# Patient Record
Sex: FEMALE | Race: WHITE | NOT HISPANIC OR LATINO | Employment: UNEMPLOYED | URBAN - METROPOLITAN AREA
[De-identification: names, ages, dates, MRNs, and addresses within clinical notes are randomized per-mention and may not be internally consistent; named-entity substitution may affect disease eponyms.]

---

## 2017-01-17 ENCOUNTER — OFFICE VISIT (OUTPATIENT)
Dept: FAMILY MEDICINE CLINIC | Facility: CLINIC | Age: 52
End: 2017-01-17

## 2017-01-17 VITALS
DIASTOLIC BLOOD PRESSURE: 64 MMHG | TEMPERATURE: 96.7 F | RESPIRATION RATE: 14 BRPM | SYSTOLIC BLOOD PRESSURE: 108 MMHG | WEIGHT: 162 LBS | HEIGHT: 67 IN | BODY MASS INDEX: 25.43 KG/M2 | HEART RATE: 55 BPM

## 2017-01-17 DIAGNOSIS — F43.0 ACUTE REACTION TO STRESS: ICD-10-CM

## 2017-01-17 DIAGNOSIS — E78.2 MIXED HYPERLIPIDEMIA: ICD-10-CM

## 2017-01-17 DIAGNOSIS — F51.01 PRIMARY INSOMNIA: ICD-10-CM

## 2017-01-17 DIAGNOSIS — F33.42 MAJOR DEPRESSIVE DISORDER, RECURRENT EPISODE, IN FULL REMISSION (HCC): ICD-10-CM

## 2017-01-17 DIAGNOSIS — E03.9 ACQUIRED HYPOTHYROIDISM: ICD-10-CM

## 2017-01-17 PROCEDURE — 99214 OFFICE O/P EST MOD 30 MIN: CPT | Performed by: FAMILY MEDICINE

## 2017-01-17 RX ORDER — FENOFIBRATE 90 MG/1
90 CAPSULE ORAL DAILY
Qty: 30 CAPSULE | Refills: 5 | Status: SHIPPED | OUTPATIENT
Start: 2017-01-17 | End: 2017-07-11 | Stop reason: SDUPTHER

## 2017-01-17 RX ORDER — ALPRAZOLAM 0.5 MG/1
0.5 TABLET ORAL 2 TIMES DAILY
Qty: 60 TABLET | Refills: 2 | Status: SHIPPED | OUTPATIENT
Start: 2017-01-17 | End: 2017-04-19 | Stop reason: SDUPTHER

## 2017-01-17 RX ORDER — ZOLPIDEM TARTRATE 10 MG/1
10 TABLET ORAL NIGHTLY PRN
Qty: 30 TABLET | Refills: 2 | Status: SHIPPED | OUTPATIENT
Start: 2017-01-17 | End: 2017-04-19 | Stop reason: SDUPTHER

## 2017-01-17 RX ORDER — LEVOTHYROXINE SODIUM 0.03 MG/1
25 TABLET ORAL DAILY
Qty: 30 TABLET | Refills: 5 | Status: SHIPPED | OUTPATIENT
Start: 2017-01-17 | End: 2017-07-11 | Stop reason: SDUPTHER

## 2017-01-17 RX ORDER — FLUOXETINE HYDROCHLORIDE 20 MG/1
CAPSULE ORAL
Qty: 60 CAPSULE | Refills: 5 | Status: SHIPPED | OUTPATIENT
Start: 2017-01-17 | End: 2017-07-11 | Stop reason: SDUPTHER

## 2017-01-17 NOTE — PROGRESS NOTES
"Subjective   Willie Enciso is a 51 y.o. female.     History of Present Illness     Chief Complaint:   Chief Complaint   Patient presents with   • Hyperlipidemia     [facility]   • Hypothyroidism     LATE FOR APPOINTMENT    • Anxiety   • Depression   • Insomnia       Willie Enciso 51 y.o. female who presents today for Medical Management of the below listed issues and medication refills.  she has a history of   Patient Active Problem List   Diagnosis   • Hyperlipidemia   • Hypothyroidism   • Insomnia   • Lateral epicondylitis   • Major depressive disorder, recurrent episode, in full remission   • Acute reaction to stress   .  Since the last visit, she has overall felt well.  she has been compliant with   Current Outpatient Prescriptions:   •  ALPRAZolam (XANAX) 0.5 MG tablet, Take 1 tablet by mouth 2 (Two) Times a Day., Disp: 60 tablet, Rfl: 2  •  ANTARA 90 MG capsule, Take 90 mg by mouth Daily., Disp: 30 capsule, Rfl: 5  •  FLUoxetine (PROzac) 20 MG capsule, Take 2 tablets QD, Disp: 60 capsule, Rfl: 5  •  levothyroxine (SYNTHROID, LEVOTHROID) 25 MCG tablet, Take 1 tablet by mouth Daily., Disp: 30 tablet, Rfl: 5  •  zolpidem (AMBIEN) 10 MG tablet, Take 1 tablet by mouth At Night As Needed for sleep., Disp: 30 tablet, Rfl: 2.  she denies medication side effects.    All of the chronic condition(s) listed above are stable w/o issues.    Visit Vitals   • /64   • Pulse 55   • Temp 96.7 °F (35.9 °C) (Oral)   • Resp 14   • Ht 67\" (170.2 cm)   • Wt 162 lb (73.5 kg)   • BMI 25.37 kg/m2       Results for orders placed or performed in visit on 09/14/16   Comprehensive metabolic panel   Result Value Ref Range    Glucose 92 65 - 99 mg/dL    BUN 18 6 - 20 mg/dL    Creatinine 1.21 (H) 0.57 - 1.00 mg/dL    eGFR Non African Am 47 (L) >60 mL/min/1.73    eGFR African Am 57 (L) >60 mL/min/1.73    BUN/Creatinine Ratio 14.9 7.0 - 25.0    Sodium 142 136 - 145 mmol/L    Potassium 5.6 (H) 3.5 - 5.2 mmol/L    Chloride 104 98 " - 107 mmol/L    Total CO2 24.5 22.0 - 29.0 mmol/L    Calcium 9.8 8.6 - 10.5 mg/dL    Total Protein 6.8 6.0 - 8.5 g/dL    Albumin 4.70 3.50 - 5.20 g/dL    Globulin 2.1 gm/dL    A/G Ratio 2.2 g/dL    Total Bilirubin 0.3 0.1 - 1.2 mg/dL    Alkaline Phosphatase 61 39 - 117 U/L    AST (SGOT) 16 1 - 32 U/L    ALT (SGPT) 15 1 - 33 U/L   Lipid panel   Result Value Ref Range    Total Cholesterol 227 (H) 0 - 200 mg/dL    Triglycerides 136 0 - 150 mg/dL    HDL Cholesterol 47 40 - 60 mg/dL    VLDL Cholesterol 27.2 5 - 40 mg/dL    LDL Cholesterol  153 (H) 0 - 100 mg/dL   T4, free   Result Value Ref Range    Free T4 1.41 0.93 - 1.70 ng/dL   TSH   Result Value Ref Range    TSH 5.010 (H) 0.270 - 4.200 mIU/mL   CBC and Differential   Result Value Ref Range    WBC 7.08 4.50 - 10.70 10*3/mm3    RBC 4.62 3.90 - 5.20 10*6/mm3    Hemoglobin 14.2 11.9 - 15.5 g/dL    Hematocrit 43.9 35.6 - 45.5 %    MCV 95.0 80.5 - 98.2 fL    MCH 30.7 26.9 - 32.7 pg    MCHC 32.3 (L) 32.4 - 36.3 g/dL    RDW 13.5 (H) 11.7 - 13.0 %    Platelets 266 140 - 500 10*3/mm3    Neutrophil Rel % 53.5 42.7 - 76.0 %    Lymphocyte Rel % 33.9 19.6 - 45.3 %    Monocyte Rel % 8.1 5.0 - 12.0 %    Eosinophil Rel % 3.2 0.3 - 6.2 %    Basophil Rel % 1.0 0.0 - 1.5 %    Neutrophils Absolute 3.79 1.90 - 8.10 10*3/mm3    Lymphocytes Absolute 2.40 0.90 - 4.80 10*3/mm3    Monocytes Absolute 0.57 0.20 - 1.20 10*3/mm3    Eosinophils Absolute 0.23 0.00 - 0.70 10*3/mm3    Basophils Absolute 0.07 0.00 - 0.20 10*3/mm3    Immature Granulocyte Rel % 0.3 0.0 - 0.5 %    Immature Grans Absolute 0.02 0.00 - 0.03 10*3/mm3         The following portions of the patient's history were reviewed and updated as appropriate: allergies, current medications, past family history, past medical history, past social history, past surgical history and problem list.    Review of Systems   Constitutional: Negative for activity change, chills, fatigue and fever.   Respiratory: Negative for cough and chest  tightness.    Cardiovascular: Negative for chest pain and palpitations.   Gastrointestinal: Negative for abdominal pain and nausea.   Endocrine: Negative for cold intolerance.   Psychiatric/Behavioral: Negative for behavioral problems and dysphoric mood.       Objective   Physical Exam   Constitutional: She appears well-developed and well-nourished.   Neck: Neck supple. No thyromegaly present.   Cardiovascular: Normal rate and regular rhythm.    No murmur heard.  Pulmonary/Chest: Effort normal and breath sounds normal.   Abdominal: Bowel sounds are normal.   Psychiatric: She has a normal mood and affect. Her behavior is normal.   Nursing note and vitals reviewed.    The patient has read and signed the [facility] Controlled Substance Contract.  I will continue to see patient for regular follow up appointments.  They are well controlled on their medication.  CHIQUI has been reviewed by me and is updated every 3 months. The patient is aware of the potential for addiction and dependence.    Assessment/Plan   Willie was seen today for hyperlipidemia, hypothyroidism, anxiety, depression and insomnia.    Diagnoses and all orders for this visit:    Acute reaction to stress  -     ALPRAZolam (XANAX) 0.5 MG tablet; Take 1 tablet by mouth 2 (Two) Times a Day.    Primary insomnia  -     zolpidem (AMBIEN) 10 MG tablet; Take 1 tablet by mouth At Night As Needed for sleep.    Acquired hypothyroidism  -     levothyroxine (SYNTHROID, LEVOTHROID) 25 MCG tablet; Take 1 tablet by mouth Daily.    Major depressive disorder, recurrent episode, in full remission  -     FLUoxetine (PROzac) 20 MG capsule; Take 2 tablets QD    Mixed hyperlipidemia  -     ANTARA 90 MG capsule; Take 90 mg by mouth Daily.

## 2017-01-17 NOTE — MR AVS SNAPSHOT
Willie Enciso   1/17/2017 8:15 AM   Office Visit    Provider:  Tom Montoya MD   Department:  [facility]   Dept Phone:  [phone]                Your Full Care Plan              Where to Get Your Medications      These medications were sent to [facility] [place], [place] - [address] - [phone]  - [phone]   [address] [facility] [address]     Phone:  [phone]     ANTARA 90 MG capsule    FLUoxetine 20 MG capsule    levothyroxine 25 MCG tablet         You can get these medications from any pharmacy     Bring a paper prescription for each of these medications     ALPRAZolam 0.5 MG tablet    zolpidem 10 MG tablet            Your Updated Medication List          This list is accurate as of: 1/17/17  9:10 AM.  Always use your most recent med list.                ALPRAZolam 0.5 MG tablet   Commonly known as:  XANAX   Take 1 tablet by mouth 2 (Two) Times a Day.       ANTARA 90 MG capsule   Generic drug:  Fenofibrate Micronized   Take 90 mg by mouth Daily.       FLUoxetine 20 MG capsule   Commonly known as:  PROzac   Take 2 tablets QD       levothyroxine 25 MCG tablet   Commonly known as:  SYNTHROID, LEVOTHROID   Take 1 tablet by mouth Daily.       zolpidem 10 MG tablet   Commonly known as:  AMBIEN   Take 1 tablet by mouth At Night As Needed for sleep.               You Were Diagnosed With        Codes Comments    Acute reaction to stress     ICD-10-CM: F43.0  ICD-9-CM: 308.9     Primary insomnia     ICD-10-CM: F51.01  ICD-9-CM: 307.42     Acquired hypothyroidism     ICD-10-CM: E03.9  ICD-9-CM: 244.9     Major depressive disorder, recurrent episode, in full remission     ICD-10-CM: F33.42  ICD-9-CM: 296.36     Mixed hyperlipidemia     ICD-10-CM: E78.2  ICD-9-CM: 272.2       Instructions     None    Patient Instructions History      MyChart Signup     Our records indicate that [facility].    You can view your After Visit Summary by going to [email or web address] and logging in with your [facility] username and password.  If you don't have a [facility] username and password but a parent or guardian has access to your record, the parent or guardian should login with their own [facility] username and password and access your record to view the After Visit Summary.    If you have questions, you can email [email or web address] or call [phone] to talk to our [facility] staff.  Remember, [facility] is NOT to be used for urgent needs.  For medical emergencies, dial 911.               Other Info from Your Visit           Allergies     No Known Allergies      Reason for Visit     Hyperlipidemia med refill - CHIQUI CONDON    Hypothyroidism LATE FOR APPOINTMENT     Anxiety     Depression     Insomnia           Vital Signs     Blood Pressure Pulse Temperature Respirations Height Weight    108/64 55 96.7 °F (35.9 °C) (Oral) 14 67\" (170.2 cm) 162 lb (73.5 kg)    Body Mass Index Smoking Status                25.37 kg/m2 Current Every Day Smoker          Problems and Diagnoses Noted     Stress reaction    High cholesterol or triglycerides    Underactive thyroid    Difficulty falling or staying asleep    Major depressive disorder, recurrent episode, in full remission      "

## 2017-04-19 ENCOUNTER — OFFICE VISIT (OUTPATIENT)
Dept: FAMILY MEDICINE CLINIC | Facility: CLINIC | Age: 52
End: 2017-04-19

## 2017-04-19 VITALS
HEART RATE: 72 BPM | DIASTOLIC BLOOD PRESSURE: 76 MMHG | SYSTOLIC BLOOD PRESSURE: 114 MMHG | TEMPERATURE: 98.6 F | BODY MASS INDEX: 24.33 KG/M2 | HEIGHT: 67 IN | WEIGHT: 155 LBS | RESPIRATION RATE: 16 BRPM

## 2017-04-19 DIAGNOSIS — F43.0 ACUTE REACTION TO STRESS: ICD-10-CM

## 2017-04-19 DIAGNOSIS — F51.01 PRIMARY INSOMNIA: ICD-10-CM

## 2017-04-19 PROCEDURE — 99213 OFFICE O/P EST LOW 20 MIN: CPT | Performed by: FAMILY MEDICINE

## 2017-04-19 RX ORDER — ALPRAZOLAM 0.5 MG/1
0.5 TABLET ORAL 2 TIMES DAILY
Qty: 60 TABLET | Refills: 2 | Status: SHIPPED | OUTPATIENT
Start: 2017-04-19 | End: 2017-07-11 | Stop reason: SDUPTHER

## 2017-04-19 RX ORDER — ZOLPIDEM TARTRATE 10 MG/1
10 TABLET ORAL NIGHTLY PRN
Qty: 30 TABLET | Refills: 2 | Status: SHIPPED | OUTPATIENT
Start: 2017-04-19 | End: 2017-07-11 | Stop reason: SDUPTHER

## 2017-04-19 NOTE — PROGRESS NOTES
"Subjective   Willie Enciso is a 51 y.o. female.     History of Present Illness     Chief Complaint:   Chief Complaint   Patient presents with   • Insomnia     MED REFILL - CHIQUI / PHQ2 PHQ9 DONE    • Anxiety       Willie Enciso 51 y.o. female who presents today for Medical Management of the below listed issues and medication refills.  she has a history of   Patient Active Problem List   Diagnosis   • Hyperlipidemia   • Hypothyroidism   • Insomnia   • Lateral epicondylitis   • Major depressive disorder, recurrent episode, in full remission   • Acute reaction to stress   .  Since the last visit, she has overall felt well.  she has been compliant with   Current Outpatient Prescriptions:   •  ALPRAZolam (XANAX) 0.5 MG tablet, Take 1 tablet by mouth 2 (Two) Times a Day., Disp: 60 tablet, Rfl: 2  •  zolpidem (AMBIEN) 10 MG tablet, Take 1 tablet by mouth At Night As Needed for Sleep., Disp: 30 tablet, Rfl: 2  •  ANTARA 90 MG capsule, Take 90 mg by mouth Daily., Disp: 30 capsule, Rfl: 5  •  FLUoxetine (PROzac) 20 MG capsule, Take 2 tablets QD, Disp: 60 capsule, Rfl: 5  •  levothyroxine (SYNTHROID, LEVOTHROID) 25 MCG tablet, Take 1 tablet by mouth Daily., Disp: 30 tablet, Rfl: 5.  she denies medication side effects.    She has been eating better and exercising more and weight down!    All of the chronic condition(s) listed above are stable w/o issues.    /76  Pulse 72  Temp 98.6 °F (37 °C) (Oral)   Resp 16  Ht 67\" (170.2 cm)  Wt 155 lb (70.3 kg)  BMI 24.28 kg/m2    Results for orders placed or performed in visit on 09/14/16   Comprehensive metabolic panel   Result Value Ref Range    Glucose 92 65 - 99 mg/dL    BUN 18 6 - 20 mg/dL    Creatinine 1.21 (H) 0.57 - 1.00 mg/dL    eGFR Non African Am 47 (L) >60 mL/min/1.73    eGFR African Am 57 (L) >60 mL/min/1.73    BUN/Creatinine Ratio 14.9 7.0 - 25.0    Sodium 142 136 - 145 mmol/L    Potassium 5.6 (H) 3.5 - 5.2 mmol/L    Chloride 104 98 - 107 mmol/L    Total CO2 24.5 " 22.0 - 29.0 mmol/L    Calcium 9.8 8.6 - 10.5 mg/dL    Total Protein 6.8 6.0 - 8.5 g/dL    Albumin 4.70 3.50 - 5.20 g/dL    Globulin 2.1 gm/dL    A/G Ratio 2.2 g/dL    Total Bilirubin 0.3 0.1 - 1.2 mg/dL    Alkaline Phosphatase 61 39 - 117 U/L    AST (SGOT) 16 1 - 32 U/L    ALT (SGPT) 15 1 - 33 U/L   Lipid panel   Result Value Ref Range    Total Cholesterol 227 (H) 0 - 200 mg/dL    Triglycerides 136 0 - 150 mg/dL    HDL Cholesterol 47 40 - 60 mg/dL    VLDL Cholesterol 27.2 5 - 40 mg/dL    LDL Cholesterol  153 (H) 0 - 100 mg/dL   T4, free   Result Value Ref Range    Free T4 1.41 0.93 - 1.70 ng/dL   TSH   Result Value Ref Range    TSH 5.010 (H) 0.270 - 4.200 mIU/mL   CBC and Differential   Result Value Ref Range    WBC 7.08 4.50 - 10.70 10*3/mm3    RBC 4.62 3.90 - 5.20 10*6/mm3    Hemoglobin 14.2 11.9 - 15.5 g/dL    Hematocrit 43.9 35.6 - 45.5 %    MCV 95.0 80.5 - 98.2 fL    MCH 30.7 26.9 - 32.7 pg    MCHC 32.3 (L) 32.4 - 36.3 g/dL    RDW 13.5 (H) 11.7 - 13.0 %    Platelets 266 140 - 500 10*3/mm3    Neutrophil Rel % 53.5 42.7 - 76.0 %    Lymphocyte Rel % 33.9 19.6 - 45.3 %    Monocyte Rel % 8.1 5.0 - 12.0 %    Eosinophil Rel % 3.2 0.3 - 6.2 %    Basophil Rel % 1.0 0.0 - 1.5 %    Neutrophils Absolute 3.79 1.90 - 8.10 10*3/mm3    Lymphocytes Absolute 2.40 0.90 - 4.80 10*3/mm3    Monocytes Absolute 0.57 0.20 - 1.20 10*3/mm3    Eosinophils Absolute 0.23 0.00 - 0.70 10*3/mm3    Basophils Absolute 0.07 0.00 - 0.20 10*3/mm3    Immature Granulocyte Rel % 0.3 0.0 - 0.5 %    Immature Grans Absolute 0.02 0.00 - 0.03 10*3/mm3         The following portions of the patient's history were reviewed and updated as appropriate: allergies, current medications, past family history, past medical history, past social history, past surgical history and problem list.    Review of Systems   Constitutional: Negative for activity change, chills, fatigue and fever.   Respiratory: Negative for cough and chest tightness.    Cardiovascular: Negative  for chest pain and palpitations.   Gastrointestinal: Negative for abdominal pain and nausea.   Endocrine: Negative for cold intolerance.   Psychiatric/Behavioral: Negative for behavioral problems and dysphoric mood.       Objective   Physical Exam   Constitutional: She appears well-developed and well-nourished.   Neck: Neck supple. No thyromegaly present.   Cardiovascular: Normal rate and regular rhythm.    No murmur heard.  Pulmonary/Chest: Effort normal and breath sounds normal.   Abdominal: Bowel sounds are normal.   Psychiatric: She has a normal mood and affect. Her behavior is normal.   Nursing note and vitals reviewed.    The patient has read and signed the [facility] Controlled Substance Contract.  I will continue to see patient for regular follow up appointments.  They are well controlled on their medication.  CHIQUI has been reviewed by me and is updated every 3 months. The patient is aware of the potential for addiction and dependence.    Assessment/Plan   Willie was seen today for insomnia and anxiety.    Diagnoses and all orders for this visit:    Primary insomnia  -     zolpidem (AMBIEN) 10 MG tablet; Take 1 tablet by mouth At Night As Needed for Sleep.    Acute reaction to stress  -     ALPRAZolam (XANAX) 0.5 MG tablet; Take 1 tablet by mouth 2 (Two) Times a Day.

## 2017-07-11 ENCOUNTER — OFFICE VISIT (OUTPATIENT)
Dept: FAMILY MEDICINE CLINIC | Facility: CLINIC | Age: 52
End: 2017-07-11

## 2017-07-11 VITALS
HEART RATE: 72 BPM | BODY MASS INDEX: 24.48 KG/M2 | DIASTOLIC BLOOD PRESSURE: 72 MMHG | SYSTOLIC BLOOD PRESSURE: 118 MMHG | RESPIRATION RATE: 16 BRPM | TEMPERATURE: 98.5 F | HEIGHT: 67 IN | WEIGHT: 156 LBS

## 2017-07-11 DIAGNOSIS — Z00.00 ANNUAL PHYSICAL EXAM: Primary | ICD-10-CM

## 2017-07-11 DIAGNOSIS — E03.9 ACQUIRED HYPOTHYROIDISM: ICD-10-CM

## 2017-07-11 DIAGNOSIS — F33.42 MAJOR DEPRESSIVE DISORDER, RECURRENT EPISODE, IN FULL REMISSION (HCC): ICD-10-CM

## 2017-07-11 DIAGNOSIS — F43.0 ACUTE REACTION TO STRESS: ICD-10-CM

## 2017-07-11 DIAGNOSIS — F51.01 PRIMARY INSOMNIA: ICD-10-CM

## 2017-07-11 DIAGNOSIS — E78.2 MIXED HYPERLIPIDEMIA: ICD-10-CM

## 2017-07-11 PROCEDURE — 99214 OFFICE O/P EST MOD 30 MIN: CPT | Performed by: FAMILY MEDICINE

## 2017-07-11 RX ORDER — LEVOTHYROXINE SODIUM 0.03 MG/1
25 TABLET ORAL DAILY
Qty: 30 TABLET | Refills: 5 | Status: SHIPPED | OUTPATIENT
Start: 2017-07-11 | End: 2017-12-18 | Stop reason: SDUPTHER

## 2017-07-11 RX ORDER — FENOFIBRATE 90 MG/1
90 CAPSULE ORAL DAILY
Qty: 30 CAPSULE | Refills: 5 | Status: SHIPPED | OUTPATIENT
Start: 2017-07-11 | End: 2017-12-18 | Stop reason: SDUPTHER

## 2017-07-11 RX ORDER — ALPRAZOLAM 0.5 MG/1
0.5 TABLET ORAL 2 TIMES DAILY
Qty: 60 TABLET | Refills: 2 | Status: SHIPPED | OUTPATIENT
Start: 2017-07-11 | End: 2017-09-20 | Stop reason: SDUPTHER

## 2017-07-11 RX ORDER — ZOLPIDEM TARTRATE 10 MG/1
10 TABLET ORAL NIGHTLY PRN
Qty: 30 TABLET | Refills: 2 | Status: SHIPPED | OUTPATIENT
Start: 2017-07-11 | End: 2017-09-20 | Stop reason: SDUPTHER

## 2017-07-11 RX ORDER — FLUOXETINE HYDROCHLORIDE 20 MG/1
CAPSULE ORAL
Qty: 60 CAPSULE | Refills: 5 | Status: SHIPPED | OUTPATIENT
Start: 2017-07-11 | End: 2017-12-18 | Stop reason: SDUPTHER

## 2017-07-11 NOTE — PROGRESS NOTES
"Subjective   Willie Enciso is a 51 y.o. female.     History of Present Illness     Chief Complaint:   Chief Complaint   Patient presents with   • Hyperlipidemia     MED REFILL CHQIUI CONDON   • Anxiety   • Insomnia   • Hypothyroidism       iWllie Enciso 51 y.o. female who presents today for Medical Management of the below listed issues and medication refills.  she has a history of   Patient Active Problem List   Diagnosis   • Hyperlipidemia   • Hypothyroidism   • Insomnia   • Lateral epicondylitis   • Major depressive disorder, recurrent episode, in full remission   • Acute reaction to stress   .  Since the last visit, she has overall felt well.  she has been compliant with   Current Outpatient Prescriptions:   •  ALPRAZolam (XANAX) 0.5 MG tablet, Take 1 tablet by mouth 2 (Two) Times a Day., Disp: 60 tablet, Rfl: 2  •  ANTARA 90 MG capsule, Take 90 mg by mouth Daily., Disp: 30 capsule, Rfl: 5  •  FLUoxetine (PROzac) 20 MG capsule, Take 2 tablets QD, Disp: 60 capsule, Rfl: 5  •  levothyroxine (SYNTHROID, LEVOTHROID) 25 MCG tablet, Take 1 tablet by mouth Daily., Disp: 30 tablet, Rfl: 5  •  zolpidem (AMBIEN) 10 MG tablet, Take 1 tablet by mouth At Night As Needed for Sleep., Disp: 30 tablet, Rfl: 2.  she denies medication side effects.    All of the chronic condition(s) listed above are stable w/o issues.    /72  Pulse 72  Temp 98.5 °F (36.9 °C) (Oral)   Resp 16  Ht 67\" (170.2 cm)  Wt 156 lb (70.8 kg)  BMI 24.43 kg/m2    Results for orders placed or performed in visit on 09/14/16   Comprehensive metabolic panel   Result Value Ref Range    Glucose 92 65 - 99 mg/dL    BUN 18 6 - 20 mg/dL    Creatinine 1.21 (H) 0.57 - 1.00 mg/dL    eGFR Non African Am 47 (L) >60 mL/min/1.73    eGFR African Am 57 (L) >60 mL/min/1.73    BUN/Creatinine Ratio 14.9 7.0 - 25.0    Sodium 142 136 - 145 mmol/L    Potassium 5.6 (H) 3.5 - 5.2 mmol/L    Chloride 104 98 - 107 mmol/L    Total CO2 24.5 22.0 - 29.0 mmol/L    Calcium 9.8 8.6 - " 10.5 mg/dL    Total Protein 6.8 6.0 - 8.5 g/dL    Albumin 4.70 3.50 - 5.20 g/dL    Globulin 2.1 gm/dL    A/G Ratio 2.2 g/dL    Total Bilirubin 0.3 0.1 - 1.2 mg/dL    Alkaline Phosphatase 61 39 - 117 U/L    AST (SGOT) 16 1 - 32 U/L    ALT (SGPT) 15 1 - 33 U/L   Lipid panel   Result Value Ref Range    Total Cholesterol 227 (H) 0 - 200 mg/dL    Triglycerides 136 0 - 150 mg/dL    HDL Cholesterol 47 40 - 60 mg/dL    VLDL Cholesterol 27.2 5 - 40 mg/dL    LDL Cholesterol  153 (H) 0 - 100 mg/dL   T4, free   Result Value Ref Range    Free T4 1.41 0.93 - 1.70 ng/dL   TSH   Result Value Ref Range    TSH 5.010 (H) 0.270 - 4.200 mIU/mL   CBC and Differential   Result Value Ref Range    WBC 7.08 4.50 - 10.70 10*3/mm3    RBC 4.62 3.90 - 5.20 10*6/mm3    Hemoglobin 14.2 11.9 - 15.5 g/dL    Hematocrit 43.9 35.6 - 45.5 %    MCV 95.0 80.5 - 98.2 fL    MCH 30.7 26.9 - 32.7 pg    MCHC 32.3 (L) 32.4 - 36.3 g/dL    RDW 13.5 (H) 11.7 - 13.0 %    Platelets 266 140 - 500 10*3/mm3    Neutrophil Rel % 53.5 42.7 - 76.0 %    Lymphocyte Rel % 33.9 19.6 - 45.3 %    Monocyte Rel % 8.1 5.0 - 12.0 %    Eosinophil Rel % 3.2 0.3 - 6.2 %    Basophil Rel % 1.0 0.0 - 1.5 %    Neutrophils Absolute 3.79 1.90 - 8.10 10*3/mm3    Lymphocytes Absolute 2.40 0.90 - 4.80 10*3/mm3    Monocytes Absolute 0.57 0.20 - 1.20 10*3/mm3    Eosinophils Absolute 0.23 0.00 - 0.70 10*3/mm3    Basophils Absolute 0.07 0.00 - 0.20 10*3/mm3    Immature Granulocyte Rel % 0.3 0.0 - 0.5 %    Immature Grans Absolute 0.02 0.00 - 0.03 10*3/mm3         The following portions of the patient's history were reviewed and updated as appropriate: allergies, current medications, past family history, past medical history, past social history, past surgical history and problem list.    Review of Systems   Constitutional: Negative for activity change, chills, fatigue and fever.   Respiratory: Negative for cough and chest tightness.    Cardiovascular: Negative for chest pain and palpitations.    Gastrointestinal: Negative for abdominal pain and nausea.   Endocrine: Negative for cold intolerance.   Psychiatric/Behavioral: Negative for behavioral problems and dysphoric mood.       Objective   Physical Exam   Constitutional: She appears well-developed and well-nourished.   Neck: Neck supple. No thyromegaly present.   Cardiovascular: Normal rate and regular rhythm.    No murmur heard.  Pulmonary/Chest: Effort normal and breath sounds normal.   Abdominal: Bowel sounds are normal.   Psychiatric: She has a normal mood and affect. Her behavior is normal.   Nursing note and vitals reviewed.    The patient has read and signed the [facility] Controlled Substance Contract.  I will continue to see patient for regular follow up appointments.  They are well controlled on their medication.  CHIQUI has been reviewed by me and is updated every 3 months. The patient is aware of the potential for addiction and dependence.    Assessment/Plan   Willie was seen today for hyperlipidemia, anxiety, insomnia and hypothyroidism.    Diagnoses and all orders for this visit:    Annual physical exam  Comments:  for labs only  Orders:  -     Comprehensive metabolic panel  -     CBC and Differential    Primary insomnia  -     zolpidem (AMBIEN) 10 MG tablet; Take 1 tablet by mouth At Night As Needed for Sleep.    Acute reaction to stress  -     ALPRAZolam (XANAX) 0.5 MG tablet; Take 1 tablet by mouth 2 (Two) Times a Day.    Acquired hypothyroidism  -     levothyroxine (SYNTHROID, LEVOTHROID) 25 MCG tablet; Take 1 tablet by mouth Daily.  -     TSH  -     T4, Free    Major depressive disorder, recurrent episode, in full remission  -     FLUoxetine (PROzac) 20 MG capsule; Take 2 tablets QD    Mixed hyperlipidemia  -     ANTARA 90 MG capsule; Take 90 mg by mouth Daily.  -     Lipid panel

## 2017-09-15 LAB
ALBUMIN SERPL-MCNC: 4 G/DL (ref 3.5–5.5)
ALBUMIN/GLOB SERPL: 1.6 {RATIO} (ref 1.2–2.2)
ALP SERPL-CCNC: 59 IU/L (ref 39–117)
ALT SERPL-CCNC: 17 IU/L (ref 0–32)
AST SERPL-CCNC: 22 IU/L (ref 0–40)
BASOPHILS # BLD AUTO: 0.1 X10E3/UL (ref 0–0.2)
BASOPHILS NFR BLD AUTO: 1 %
BILIRUB SERPL-MCNC: 0.2 MG/DL (ref 0–1.2)
BUN SERPL-MCNC: 17 MG/DL (ref 6–24)
BUN/CREAT SERPL: 19 (ref 9–23)
CALCIUM SERPL-MCNC: 9.4 MG/DL (ref 8.7–10.2)
CHLORIDE SERPL-SCNC: 104 MMOL/L (ref 96–106)
CHOLEST SERPL-MCNC: 210 MG/DL (ref 100–199)
CO2 SERPL-SCNC: 25 MMOL/L (ref 18–29)
CREAT SERPL-MCNC: 0.91 MG/DL (ref 0.57–1)
EOSINOPHIL # BLD AUTO: 0.2 X10E3/UL (ref 0–0.4)
EOSINOPHIL NFR BLD AUTO: 3 %
ERYTHROCYTE [DISTWIDTH] IN BLOOD BY AUTOMATED COUNT: 13.9 % (ref 12.3–15.4)
GLOBULIN SER CALC-MCNC: 2.5 G/DL (ref 1.5–4.5)
GLUCOSE SERPL-MCNC: 100 MG/DL (ref 65–99)
HCT VFR BLD AUTO: 39.9 % (ref 34–46.6)
HDLC SERPL-MCNC: 54 MG/DL
HGB BLD-MCNC: 13.5 G/DL (ref 11.1–15.9)
IMM GRANULOCYTES # BLD: 0 X10E3/UL (ref 0–0.1)
IMM GRANULOCYTES NFR BLD: 0 %
LDLC SERPL CALC-MCNC: 140 MG/DL (ref 0–99)
LYMPHOCYTES # BLD AUTO: 2.1 X10E3/UL (ref 0.7–3.1)
LYMPHOCYTES NFR BLD AUTO: 30 %
MCH RBC QN AUTO: 31 PG (ref 26.6–33)
MCHC RBC AUTO-ENTMCNC: 33.8 G/DL (ref 31.5–35.7)
MCV RBC AUTO: 92 FL (ref 79–97)
MONOCYTES # BLD AUTO: 0.6 X10E3/UL (ref 0.1–0.9)
MONOCYTES NFR BLD AUTO: 9 %
NEUTROPHILS # BLD AUTO: 4.1 X10E3/UL (ref 1.4–7)
NEUTROPHILS NFR BLD AUTO: 57 %
PLATELET # BLD AUTO: 284 X10E3/UL (ref 150–379)
POTASSIUM SERPL-SCNC: 5 MMOL/L (ref 3.5–5.2)
PROT SERPL-MCNC: 6.5 G/DL (ref 6–8.5)
RBC # BLD AUTO: 4.35 X10E6/UL (ref 3.77–5.28)
SODIUM SERPL-SCNC: 142 MMOL/L (ref 134–144)
T4 FREE SERPL-MCNC: 1.16 NG/DL (ref 0.82–1.77)
TRIGL SERPL-MCNC: 81 MG/DL (ref 0–149)
TSH SERPL DL<=0.005 MIU/L-ACNC: 3.49 UIU/ML (ref 0.45–4.5)
VLDLC SERPL CALC-MCNC: 16 MG/DL (ref 5–40)
WBC # BLD AUTO: 7.1 X10E3/UL (ref 3.4–10.8)

## 2017-09-20 ENCOUNTER — OFFICE VISIT (OUTPATIENT)
Dept: FAMILY MEDICINE CLINIC | Facility: CLINIC | Age: 52
End: 2017-09-20

## 2017-09-20 VITALS
HEART RATE: 84 BPM | BODY MASS INDEX: 24.96 KG/M2 | TEMPERATURE: 98.3 F | HEIGHT: 67 IN | RESPIRATION RATE: 16 BRPM | DIASTOLIC BLOOD PRESSURE: 72 MMHG | SYSTOLIC BLOOD PRESSURE: 116 MMHG | WEIGHT: 159 LBS

## 2017-09-20 DIAGNOSIS — F43.0 ACUTE REACTION TO STRESS: ICD-10-CM

## 2017-09-20 DIAGNOSIS — F51.01 PRIMARY INSOMNIA: ICD-10-CM

## 2017-09-20 PROCEDURE — 99213 OFFICE O/P EST LOW 20 MIN: CPT | Performed by: FAMILY MEDICINE

## 2017-09-20 RX ORDER — ZOLPIDEM TARTRATE 10 MG/1
10 TABLET ORAL NIGHTLY PRN
Qty: 30 TABLET | Refills: 2 | Status: SHIPPED | OUTPATIENT
Start: 2017-09-20 | End: 2017-12-18 | Stop reason: SDUPTHER

## 2017-09-20 RX ORDER — ALPRAZOLAM 0.5 MG/1
0.5 TABLET ORAL 2 TIMES DAILY
Qty: 60 TABLET | Refills: 2 | Status: SHIPPED | OUTPATIENT
Start: 2017-09-20 | End: 2017-12-18 | Stop reason: SDUPTHER

## 2017-09-20 NOTE — PROGRESS NOTES
"Subjective   Willie Enciso is a 51 y.o. female.     History of Present Illness     Chief Complaint:   Chief Complaint   Patient presents with   • Anxiety     MED REFILL - [facility]   - PHQ9 DONE TODAY    • Insomnia       Willie Enciso 51 y.o. female who presents today for Medical Management of the below listed issues and medication refills.  she has a problem list of   Patient Active Problem List   Diagnosis   • Hyperlipidemia   • Hypothyroidism   • Insomnia   • Lateral epicondylitis   • Major depressive disorder, recurrent episode, in full remission   • Acute reaction to stress   .  Since the last visit, she has overall felt well.  she has been compliant with   Current Outpatient Prescriptions:   •  ALPRAZolam (XANAX) 0.5 MG tablet, Take 1 tablet by mouth 2 (Two) Times a Day., Disp: 60 tablet, Rfl: 2  •  zolpidem (AMBIEN) 10 MG tablet, Take 1 tablet by mouth At Night As Needed for Sleep., Disp: 30 tablet, Rfl: 2  •  ANTARA 90 MG capsule, Take 90 mg by mouth Daily., Disp: 30 capsule, Rfl: 5  •  FLUoxetine (PROzac) 20 MG capsule, Take 2 tablets QD, Disp: 60 capsule, Rfl: 5  •  levothyroxine (SYNTHROID, LEVOTHROID) 25 MCG tablet, Take 1 tablet by mouth Daily., Disp: 30 tablet, Rfl: 5.  she denies medication side effects.    All of the chronic condition(s) listed above are stable w/o issues.    /72  Pulse 84  Temp 98.3 °F (36.8 °C) (Oral)   Resp 16  Ht 67\" (170.2 cm)  Wt 159 lb (72.1 kg)  BMI 24.9 kg/m2    Results for orders placed or performed in visit on 07/11/17   Comprehensive metabolic panel   Result Value Ref Range    Glucose 100 (H) 65 - 99 mg/dL    BUN 17 6 - 24 mg/dL    Creatinine 0.91 0.57 - 1.00 mg/dL    eGFR Non African Am 73 >59 mL/min/1.73    eGFR African Am 84 >59 mL/min/1.73    BUN/Creatinine Ratio 19 9 - 23    Sodium 142 134 - 144 mmol/L    Potassium 5.0 3.5 - 5.2 mmol/L    Chloride 104 96 - 106 mmol/L    Total CO2 25 18 - 29 mmol/L    Calcium 9.4 8.7 - 10.2 mg/dL    Total Protein 6.5 6.0 " - 8.5 g/dL    Albumin 4.0 3.5 - 5.5 g/dL    Globulin 2.5 1.5 - 4.5 g/dL    A/G Ratio 1.6 1.2 - 2.2    Total Bilirubin 0.2 0.0 - 1.2 mg/dL    Alkaline Phosphatase 59 39 - 117 IU/L    AST (SGOT) 22 0 - 40 IU/L    ALT (SGPT) 17 0 - 32 IU/L   Lipid panel   Result Value Ref Range    Total Cholesterol 210 (H) 100 - 199 mg/dL    Triglycerides 81 0 - 149 mg/dL    HDL Cholesterol 54 >39 mg/dL    VLDL Cholesterol 16 5 - 40 mg/dL    LDL Cholesterol  140 (H) 0 - 99 mg/dL   TSH   Result Value Ref Range    TSH 3.490 0.450 - 4.500 uIU/mL   T4, Free   Result Value Ref Range    Free T4 1.16 0.82 - 1.77 ng/dL   CBC and Differential   Result Value Ref Range    WBC 7.1 3.4 - 10.8 x10E3/uL    RBC 4.35 3.77 - 5.28 x10E6/uL    Hemoglobin 13.5 11.1 - 15.9 g/dL    Hematocrit 39.9 34.0 - 46.6 %    MCV 92 79 - 97 fL    MCH 31.0 26.6 - 33.0 pg    MCHC 33.8 31.5 - 35.7 g/dL    RDW 13.9 12.3 - 15.4 %    Platelets 284 150 - 379 x10E3/uL    Neutrophil Rel % 57 %    Lymphocyte Rel % 30 %    Monocyte Rel % 9 %    Eosinophil Rel % 3 %    Basophil Rel % 1 %    Neutrophils Absolute 4.1 1.4 - 7.0 x10E3/uL    Lymphocytes Absolute 2.1 0.7 - 3.1 x10E3/uL    Monocytes Absolute 0.6 0.1 - 0.9 x10E3/uL    Eosinophils Absolute 0.2 0.0 - 0.4 x10E3/uL    Basophils Absolute 0.1 0.0 - 0.2 x10E3/uL    Immature Granulocyte Rel % 0 %    Immature Grans Absolute 0.0 0.0 - 0.1 x10E3/uL         The following portions of the patient's history were reviewed and updated as appropriate: allergies, current medications, past family history, past medical history, past social history, past surgical history and problem list.    Review of Systems   Constitutional: Negative for activity change, chills, fatigue and fever.   Respiratory: Negative for cough and chest tightness.    Cardiovascular: Negative for chest pain and palpitations.   Gastrointestinal: Negative for abdominal pain and nausea.   Endocrine: Negative for cold intolerance.   Psychiatric/Behavioral: Negative for  behavioral problems and dysphoric mood.       Objective   Physical Exam   Constitutional: She appears well-developed and well-nourished.   Neck: Neck supple. No thyromegaly present.   Cardiovascular: Normal rate and regular rhythm.    No murmur heard.  Pulmonary/Chest: Effort normal and breath sounds normal.   Abdominal: Bowel sounds are normal.   Psychiatric: She has a normal mood and affect. Her behavior is normal.   Nursing note and vitals reviewed.  Labs reviewed with pt today during visit. All questions answered.    The patient has read and signed the [facility] Controlled Substance Contract.  I will continue to see patient for regular follow up appointments.  They are well controlled on their medication.  CHIQUI has been reviewed by me and is updated every 3 months. The patient is aware of the potential for addiction and dependence.    Assessment/Plan   Willie was seen today for anxiety and insomnia.    Diagnoses and all orders for this visit:    Primary insomnia  -     zolpidem (AMBIEN) 10 MG tablet; Take 1 tablet by mouth At Night As Needed for Sleep.    Acute reaction to stress  -     ALPRAZolam (XANAX) 0.5 MG tablet; Take 1 tablet by mouth 2 (Two) Times a Day.

## 2017-12-18 ENCOUNTER — OFFICE VISIT (OUTPATIENT)
Dept: FAMILY MEDICINE CLINIC | Facility: CLINIC | Age: 52
End: 2017-12-18

## 2017-12-18 VITALS
OXYGEN SATURATION: 98 % | SYSTOLIC BLOOD PRESSURE: 124 MMHG | WEIGHT: 167 LBS | RESPIRATION RATE: 16 BRPM | BODY MASS INDEX: 26.21 KG/M2 | DIASTOLIC BLOOD PRESSURE: 78 MMHG | HEART RATE: 64 BPM | TEMPERATURE: 98.4 F | HEIGHT: 67 IN

## 2017-12-18 DIAGNOSIS — E03.9 ACQUIRED HYPOTHYROIDISM: ICD-10-CM

## 2017-12-18 DIAGNOSIS — F51.01 PRIMARY INSOMNIA: ICD-10-CM

## 2017-12-18 DIAGNOSIS — F33.42 MAJOR DEPRESSIVE DISORDER, RECURRENT EPISODE, IN FULL REMISSION (HCC): ICD-10-CM

## 2017-12-18 DIAGNOSIS — F43.0 ACUTE REACTION TO STRESS: ICD-10-CM

## 2017-12-18 DIAGNOSIS — E78.2 MIXED HYPERLIPIDEMIA: ICD-10-CM

## 2017-12-18 PROCEDURE — 99214 OFFICE O/P EST MOD 30 MIN: CPT | Performed by: FAMILY MEDICINE

## 2017-12-18 RX ORDER — ALPRAZOLAM 0.5 MG/1
0.5 TABLET ORAL 2 TIMES DAILY
Qty: 60 TABLET | Refills: 2 | Status: SHIPPED | OUTPATIENT
Start: 2017-12-18 | End: 2018-03-28 | Stop reason: SDUPTHER

## 2017-12-18 RX ORDER — FENOFIBRATE 90 MG/1
90 CAPSULE ORAL DAILY
Qty: 30 CAPSULE | Refills: 5 | Status: SHIPPED | OUTPATIENT
Start: 2017-12-18 | End: 2018-06-28 | Stop reason: SDUPTHER

## 2017-12-18 RX ORDER — FLUOXETINE HYDROCHLORIDE 20 MG/1
CAPSULE ORAL
Qty: 60 CAPSULE | Refills: 5 | Status: SHIPPED | OUTPATIENT
Start: 2017-12-18 | End: 2018-06-28 | Stop reason: SDUPTHER

## 2017-12-18 RX ORDER — LEVOTHYROXINE SODIUM 0.03 MG/1
25 TABLET ORAL DAILY
Qty: 30 TABLET | Refills: 5 | Status: SHIPPED | OUTPATIENT
Start: 2017-12-18 | End: 2018-06-28 | Stop reason: SDUPTHER

## 2017-12-18 RX ORDER — ZOLPIDEM TARTRATE 10 MG/1
10 TABLET ORAL NIGHTLY PRN
Qty: 30 TABLET | Refills: 2 | Status: SHIPPED | OUTPATIENT
Start: 2017-12-18 | End: 2018-03-28 | Stop reason: SDUPTHER

## 2017-12-18 NOTE — PROGRESS NOTES
"Subjective   Willie Enciso is a 52 y.o. female.     History of Present Illness     Chief Complaint:   Chief Complaint   Patient presents with   • Insomnia     MED REFILL - MAMMOGRAM /PAP / BONE DENSITY / COLONOSCOPY DUE - CHIQUI CONDON   • Hyperlipidemia   • Heartburn   • Depression       Willie Enciso 52 y.o. female who presents today for Medical Management of the below listed issues and medication refills.  she has a problem list of   Patient Active Problem List   Diagnosis   • Hyperlipidemia   • Hypothyroidism   • Insomnia   • Lateral epicondylitis   • Major depressive disorder, recurrent episode, in full remission   • Acute reaction to stress   .  Since the last visit, she has overall felt well.  she has been compliant with   Current Outpatient Prescriptions:   •  ALPRAZolam (XANAX) 0.5 MG tablet, Take 1 tablet by mouth 2 (Two) Times a Day., Disp: 60 tablet, Rfl: 2  •  ANTARA 90 MG capsule, Take 90 mg by mouth Daily., Disp: 30 capsule, Rfl: 5  •  FLUoxetine (PROzac) 20 MG capsule, Take 2 tablets QD, Disp: 60 capsule, Rfl: 5  •  levothyroxine (SYNTHROID, LEVOTHROID) 25 MCG tablet, Take 1 tablet by mouth Daily., Disp: 30 tablet, Rfl: 5  •  zolpidem (AMBIEN) 10 MG tablet, Take 1 tablet by mouth At Night As Needed for Sleep., Disp: 30 tablet, Rfl: 2.  she denies medication side effects.    All of the chronic condition(s) listed above are stable w/o issues.    /78  Pulse 64  Temp 98.4 °F (36.9 °C) (Oral)   Resp 16  Ht 170.2 cm (67\")  Wt 75.8 kg (167 lb)  SpO2 98%  BMI 26.16 kg/m2    Results for orders placed or performed in visit on 07/11/17   Comprehensive metabolic panel   Result Value Ref Range    Glucose 100 (H) 65 - 99 mg/dL    BUN 17 6 - 24 mg/dL    Creatinine 0.91 0.57 - 1.00 mg/dL    eGFR Non African Am 73 >59 mL/min/1.73    eGFR African Am 84 >59 mL/min/1.73    BUN/Creatinine Ratio 19 9 - 23    Sodium 142 134 - 144 mmol/L    Potassium 5.0 3.5 - 5.2 mmol/L    Chloride 104 96 - 106 mmol/L    Total " CO2 25 18 - 29 mmol/L    Calcium 9.4 8.7 - 10.2 mg/dL    Total Protein 6.5 6.0 - 8.5 g/dL    Albumin 4.0 3.5 - 5.5 g/dL    Globulin 2.5 1.5 - 4.5 g/dL    A/G Ratio 1.6 1.2 - 2.2    Total Bilirubin 0.2 0.0 - 1.2 mg/dL    Alkaline Phosphatase 59 39 - 117 IU/L    AST (SGOT) 22 0 - 40 IU/L    ALT (SGPT) 17 0 - 32 IU/L   Lipid panel   Result Value Ref Range    Total Cholesterol 210 (H) 100 - 199 mg/dL    Triglycerides 81 0 - 149 mg/dL    HDL Cholesterol 54 >39 mg/dL    VLDL Cholesterol 16 5 - 40 mg/dL    LDL Cholesterol  140 (H) 0 - 99 mg/dL   TSH   Result Value Ref Range    TSH 3.490 0.450 - 4.500 uIU/mL   T4, Free   Result Value Ref Range    Free T4 1.16 0.82 - 1.77 ng/dL   CBC and Differential   Result Value Ref Range    WBC 7.1 3.4 - 10.8 x10E3/uL    RBC 4.35 3.77 - 5.28 x10E6/uL    Hemoglobin 13.5 11.1 - 15.9 g/dL    Hematocrit 39.9 34.0 - 46.6 %    MCV 92 79 - 97 fL    MCH 31.0 26.6 - 33.0 pg    MCHC 33.8 31.5 - 35.7 g/dL    RDW 13.9 12.3 - 15.4 %    Platelets 284 150 - 379 x10E3/uL    Neutrophil Rel % 57 %    Lymphocyte Rel % 30 %    Monocyte Rel % 9 %    Eosinophil Rel % 3 %    Basophil Rel % 1 %    Neutrophils Absolute 4.1 1.4 - 7.0 x10E3/uL    Lymphocytes Absolute 2.1 0.7 - 3.1 x10E3/uL    Monocytes Absolute 0.6 0.1 - 0.9 x10E3/uL    Eosinophils Absolute 0.2 0.0 - 0.4 x10E3/uL    Basophils Absolute 0.1 0.0 - 0.2 x10E3/uL    Immature Granulocyte Rel % 0 %    Immature Grans Absolute 0.0 0.0 - 0.1 x10E3/uL           The following portions of the patient's history were reviewed and updated as appropriate: allergies, current medications, past family history, past medical history, past social history, past surgical history and problem list.    Review of Systems   Constitutional: Negative for activity change, chills, fatigue and fever.   Respiratory: Negative for cough and chest tightness.    Cardiovascular: Negative for chest pain and palpitations.   Gastrointestinal: Negative for abdominal pain and nausea.    Endocrine: Negative for cold intolerance.   Psychiatric/Behavioral: Negative for behavioral problems and dysphoric mood.       Objective   Physical Exam   Constitutional: She appears well-developed and well-nourished.   Neck: Neck supple. No thyromegaly present.   Cardiovascular: Normal rate and regular rhythm.    No murmur heard.  Pulmonary/Chest: Effort normal and breath sounds normal.   Abdominal: Bowel sounds are normal.   Psychiatric: She has a normal mood and affect. Her behavior is normal.   Nursing note and vitals reviewed.    The patient has read and signed the [facility] Controlled Substance Contract.  I will continue to see patient for regular follow up appointments.  They are well controlled on their medication.  CHIQUI has been reviewed by me and is updated every 3 months. The patient is aware of the potential for addiction and dependence.    Assessment/Plan   Willie was seen today for insomnia, hyperlipidemia, heartburn and depression.    Diagnoses and all orders for this visit:    Acute reaction to stress  -     ALPRAZolam (XANAX) 0.5 MG tablet; Take 1 tablet by mouth 2 (Two) Times a Day.    Primary insomnia  -     zolpidem (AMBIEN) 10 MG tablet; Take 1 tablet by mouth At Night As Needed for Sleep.    Mixed hyperlipidemia  -     ANTARA 90 MG capsule; Take 90 mg by mouth Daily.    Major depressive disorder, recurrent episode, in full remission  -     FLUoxetine (PROzac) 20 MG capsule; Take 2 tablets QD    Acquired hypothyroidism  -     levothyroxine (SYNTHROID, LEVOTHROID) 25 MCG tablet; Take 1 tablet by mouth Daily.    Pt interested in ColoGuard vs c-scope and literature given to her today. She's seeing her GYN in January.

## 2018-03-28 ENCOUNTER — OFFICE VISIT (OUTPATIENT)
Dept: FAMILY MEDICINE CLINIC | Facility: CLINIC | Age: 53
End: 2018-03-28

## 2018-03-28 VITALS
SYSTOLIC BLOOD PRESSURE: 114 MMHG | HEIGHT: 67 IN | WEIGHT: 171 LBS | DIASTOLIC BLOOD PRESSURE: 72 MMHG | BODY MASS INDEX: 26.84 KG/M2 | RESPIRATION RATE: 16 BRPM | TEMPERATURE: 98.5 F | HEART RATE: 69 BPM

## 2018-03-28 DIAGNOSIS — F43.0 ACUTE REACTION TO STRESS: ICD-10-CM

## 2018-03-28 DIAGNOSIS — F51.01 PRIMARY INSOMNIA: ICD-10-CM

## 2018-03-28 PROCEDURE — 99213 OFFICE O/P EST LOW 20 MIN: CPT | Performed by: FAMILY MEDICINE

## 2018-03-28 RX ORDER — ZOLPIDEM TARTRATE 10 MG/1
10 TABLET ORAL NIGHTLY PRN
Qty: 30 TABLET | Refills: 2 | Status: SHIPPED | OUTPATIENT
Start: 2018-03-28 | End: 2018-06-28 | Stop reason: SDUPTHER

## 2018-03-28 RX ORDER — ALPRAZOLAM 0.5 MG/1
0.5 TABLET ORAL 2 TIMES DAILY
Qty: 60 TABLET | Refills: 2 | Status: SHIPPED | OUTPATIENT
Start: 2018-03-28 | End: 2018-06-28 | Stop reason: SDUPTHER

## 2018-03-28 NOTE — PROGRESS NOTES
"Subjective   Willie Enciso is a 52 y.o. female.     History of Present Illness     Chief Complaint:   Chief Complaint   Patient presents with   • Other     stress - joão med refill   • Insomnia       Willie Enciso 52 y.o. female who presents today for Medical Management of the below listed issues and medication refills.  she has a problem list of   Patient Active Problem List   Diagnosis   • Hyperlipidemia   • Hypothyroidism   • Insomnia   • Lateral epicondylitis   • Major depressive disorder, recurrent episode, in full remission   • Acute reaction to stress   .  Since the last visit, she has overall felt well.  she has been compliant with   Current Outpatient Prescriptions:   •  ALPRAZolam (XANAX) 0.5 MG tablet, Take 1 tablet by mouth 2 (Two) Times a Day., Disp: 60 tablet, Rfl: 2  •  zolpidem (AMBIEN) 10 MG tablet, Take 1 tablet by mouth At Night As Needed for Sleep., Disp: 30 tablet, Rfl: 2  •  ANTARA 90 MG capsule, Take 90 mg by mouth Daily., Disp: 30 capsule, Rfl: 5  •  FLUoxetine (PROzac) 20 MG capsule, Take 2 tablets QD, Disp: 60 capsule, Rfl: 5  •  levothyroxine (SYNTHROID, LEVOTHROID) 25 MCG tablet, Take 1 tablet by mouth Daily., Disp: 30 tablet, Rfl: 5.  she denies medication side effects.    All of the chronic condition(s) listed above are stable w/o issues.    /72   Pulse 69   Temp 98.5 °F (36.9 °C) (Oral)   Resp 16   Ht 170.2 cm (67\")   Wt 77.6 kg (171 lb)   BMI 26.78 kg/m²     Results for orders placed or performed in visit on 07/11/17   Comprehensive metabolic panel   Result Value Ref Range    Glucose 100 (H) 65 - 99 mg/dL    BUN 17 6 - 24 mg/dL    Creatinine 0.91 0.57 - 1.00 mg/dL    eGFR Non African Am 73 >59 mL/min/1.73    eGFR African Am 84 >59 mL/min/1.73    BUN/Creatinine Ratio 19 9 - 23    Sodium 142 134 - 144 mmol/L    Potassium 5.0 3.5 - 5.2 mmol/L    Chloride 104 96 - 106 mmol/L    Total CO2 25 18 - 29 mmol/L    Calcium 9.4 8.7 - 10.2 mg/dL    Total Protein 6.5 6.0 - 8.5 g/dL    " Albumin 4.0 3.5 - 5.5 g/dL    Globulin 2.5 1.5 - 4.5 g/dL    A/G Ratio 1.6 1.2 - 2.2    Total Bilirubin 0.2 0.0 - 1.2 mg/dL    Alkaline Phosphatase 59 39 - 117 IU/L    AST (SGOT) 22 0 - 40 IU/L    ALT (SGPT) 17 0 - 32 IU/L   Lipid panel   Result Value Ref Range    Total Cholesterol 210 (H) 100 - 199 mg/dL    Triglycerides 81 0 - 149 mg/dL    HDL Cholesterol 54 >39 mg/dL    VLDL Cholesterol 16 5 - 40 mg/dL    LDL Cholesterol  140 (H) 0 - 99 mg/dL   TSH   Result Value Ref Range    TSH 3.490 0.450 - 4.500 uIU/mL   T4, Free   Result Value Ref Range    Free T4 1.16 0.82 - 1.77 ng/dL   CBC and Differential   Result Value Ref Range    WBC 7.1 3.4 - 10.8 x10E3/uL    RBC 4.35 3.77 - 5.28 x10E6/uL    Hemoglobin 13.5 11.1 - 15.9 g/dL    Hematocrit 39.9 34.0 - 46.6 %    MCV 92 79 - 97 fL    MCH 31.0 26.6 - 33.0 pg    MCHC 33.8 31.5 - 35.7 g/dL    RDW 13.9 12.3 - 15.4 %    Platelets 284 150 - 379 x10E3/uL    Neutrophil Rel % 57 %    Lymphocyte Rel % 30 %    Monocyte Rel % 9 %    Eosinophil Rel % 3 %    Basophil Rel % 1 %    Neutrophils Absolute 4.1 1.4 - 7.0 x10E3/uL    Lymphocytes Absolute 2.1 0.7 - 3.1 x10E3/uL    Monocytes Absolute 0.6 0.1 - 0.9 x10E3/uL    Eosinophils Absolute 0.2 0.0 - 0.4 x10E3/uL    Basophils Absolute 0.1 0.0 - 0.2 x10E3/uL    Immature Granulocyte Rel % 0 %    Immature Grans Absolute 0.0 0.0 - 0.1 x10E3/uL           The following portions of the patient's history were reviewed and updated as appropriate: allergies, current medications, past family history, past medical history, past social history, past surgical history and problem list.    Review of Systems   Constitutional: Negative for activity change, chills, fatigue and fever.   Respiratory: Negative for cough and chest tightness.    Cardiovascular: Negative for chest pain and palpitations.   Gastrointestinal: Negative for abdominal pain and nausea.   Endocrine: Negative for cold intolerance.   Psychiatric/Behavioral: Negative for behavioral problems  and dysphoric mood.       Objective   Physical Exam   Constitutional: She appears well-developed and well-nourished.   Neck: Neck supple. No thyromegaly present.   Cardiovascular: Normal rate and regular rhythm.    No murmur heard.  Pulmonary/Chest: Effort normal and breath sounds normal.   Abdominal: Bowel sounds are normal.   Psychiatric: She has a normal mood and affect. Her behavior is normal.   Nursing note and vitals reviewed.    The patient has read and signed the [facility] Controlled Substance Contract.  I will continue to see patient for regular follow up appointments.  They are well controlled on their medication.  CHIQUI has been reviewed by me and is updated every 3 months. The patient is aware of the potential for addiction and dependence.    Assessment/Plan   Willie was seen today for other and insomnia.    Diagnoses and all orders for this visit:    Primary insomnia  -     zolpidem (AMBIEN) 10 MG tablet; Take 1 tablet by mouth At Night As Needed for Sleep.    Acute reaction to stress  -     ALPRAZolam (XANAX) 0.5 MG tablet; Take 1 tablet by mouth 2 (Two) Times a Day.

## 2018-04-03 ENCOUNTER — APPOINTMENT (OUTPATIENT)
Dept: WOMENS IMAGING | Facility: HOSPITAL | Age: 53
End: 2018-04-03

## 2018-04-03 ENCOUNTER — OFFICE VISIT (OUTPATIENT)
Dept: OBSTETRICS AND GYNECOLOGY | Facility: CLINIC | Age: 53
End: 2018-04-03

## 2018-04-03 ENCOUNTER — PROCEDURE VISIT (OUTPATIENT)
Dept: OBSTETRICS AND GYNECOLOGY | Facility: CLINIC | Age: 53
End: 2018-04-03

## 2018-04-03 VITALS
BODY MASS INDEX: 26.84 KG/M2 | HEIGHT: 67 IN | DIASTOLIC BLOOD PRESSURE: 82 MMHG | SYSTOLIC BLOOD PRESSURE: 126 MMHG | HEART RATE: 66 BPM | WEIGHT: 171 LBS

## 2018-04-03 DIAGNOSIS — Z12.11 SCREENING FOR COLON CANCER: ICD-10-CM

## 2018-04-03 DIAGNOSIS — Z12.31 VISIT FOR SCREENING MAMMOGRAM: Primary | ICD-10-CM

## 2018-04-03 DIAGNOSIS — Z01.419 PAP SMEAR, AS PART OF ROUTINE GYNECOLOGICAL EXAMINATION: ICD-10-CM

## 2018-04-03 DIAGNOSIS — Z12.4 PAPANICOLAOU SMEAR FOR CERVICAL CANCER SCREENING: ICD-10-CM

## 2018-04-03 DIAGNOSIS — Z78.0 MENOPAUSE: ICD-10-CM

## 2018-04-03 DIAGNOSIS — Z01.419 ENCOUNTER FOR GYNECOLOGICAL EXAMINATION WITHOUT ABNORMAL FINDING: ICD-10-CM

## 2018-04-03 DIAGNOSIS — Z72.0 TOBACCO USE: Primary | ICD-10-CM

## 2018-04-03 LAB
DEVELOPER EXPIRATION DATE: NORMAL
DEVELOPER LOT NUMBER: NORMAL
EXPIRATION DATE: NORMAL
FECAL OCCULT BLOOD SCREEN, POC: NEGATIVE
Lab: NORMAL
NEGATIVE CONTROL: NEGATIVE
POSITIVE CONTROL: POSITIVE

## 2018-04-03 PROCEDURE — 99396 PREV VISIT EST AGE 40-64: CPT | Performed by: OBSTETRICS & GYNECOLOGY

## 2018-04-03 PROCEDURE — 77067 SCR MAMMO BI INCL CAD: CPT | Performed by: RADIOLOGY

## 2018-04-03 PROCEDURE — 99406 BEHAV CHNG SMOKING 3-10 MIN: CPT | Performed by: OBSTETRICS & GYNECOLOGY

## 2018-04-03 PROCEDURE — G0328 FECAL BLOOD SCRN IMMUNOASSAY: HCPCS | Performed by: OBSTETRICS & GYNECOLOGY

## 2018-04-03 PROCEDURE — 77067 SCR MAMMO BI INCL CAD: CPT | Performed by: OBSTETRICS & GYNECOLOGY

## 2018-04-03 NOTE — PATIENT INSTRUCTIONS
IF YOU SMOKE OR USE TOBACCO PLEASE READ THE FOLLOWING:    Why is smoking bad for me?  Smoking increases the risk of heart disease, lung disease, vascular disease, stroke, and cancer.     If you smoke, STOP!    If you would like more information on quitting smoking, please visit the [employer] website: [email or web address]   This link will provide additional resources including the QUIT line and the Beat the Pack support groups.     For more information:    Quit Now [facility]  1-800-QUIT-NOW  [email or web address]://[email or web address].quitlogix.org/en-[email or web address]/

## 2018-04-06 ENCOUNTER — TELEPHONE (OUTPATIENT)
Dept: OBSTETRICS AND GYNECOLOGY | Facility: CLINIC | Age: 53
End: 2018-04-06

## 2018-04-06 LAB
CYTOLOGIST CVX/VAG CYTO: ABNORMAL
CYTOLOGY CVX/VAG DOC THIN PREP: ABNORMAL
DX ICD CODE: ABNORMAL
DX ICD CODE: ABNORMAL
HIV 1 & 2 AB SER-IMP: ABNORMAL
HPV I/H RISK 4 DNA CVX QL PROBE+SIG AMP: POSITIVE
HPV16 DNA CVX QL PROBE+SIG AMP: NEGATIVE
HPV18+45 E6+E7 MRNA CVX QL NAA+PROBE: NEGATIVE
OTHER STN SPEC: ABNORMAL
PATH REPORT.FINAL DX SPEC: ABNORMAL
PATHOLOGIST CVX/VAG CYTO: ABNORMAL
RECOM F/U CVX/VAG CYTO: ABNORMAL
STAT OF ADQ CVX/VAG CYTO-IMP: ABNORMAL

## 2018-04-09 ENCOUNTER — TELEPHONE (OUTPATIENT)
Dept: OBSTETRICS AND GYNECOLOGY | Facility: CLINIC | Age: 53
End: 2018-04-09

## 2018-04-12 ENCOUNTER — PROCEDURE VISIT (OUTPATIENT)
Dept: OBSTETRICS AND GYNECOLOGY | Facility: CLINIC | Age: 53
End: 2018-04-12

## 2018-04-12 VITALS
HEART RATE: 73 BPM | WEIGHT: 167 LBS | SYSTOLIC BLOOD PRESSURE: 119 MMHG | BODY MASS INDEX: 26.21 KG/M2 | HEIGHT: 67 IN | DIASTOLIC BLOOD PRESSURE: 79 MMHG

## 2018-04-12 DIAGNOSIS — R87.810 ASCUS WITH POSITIVE HIGH RISK HPV CERVICAL: Primary | ICD-10-CM

## 2018-04-12 DIAGNOSIS — R87.610 ASCUS WITH POSITIVE HIGH RISK HPV CERVICAL: Primary | ICD-10-CM

## 2018-04-12 PROCEDURE — 57456 ENDOCERV CURETTAGE W/SCOPE: CPT | Performed by: OBSTETRICS & GYNECOLOGY

## 2018-04-12 NOTE — PROGRESS NOTES
Procedure   Procedures    Colposcopy Procedure Note    Indications: Pap smear 1 months ago showed: ASCUS with POSITIVE high risk HPV. The prior pap showed no abnormalities.  Prior cervical/vaginal disease: normal exam without visible pathology. Prior cervical treatment: no treatment.    Procedure Details   The risks and benefits of the procedure and Verbal informed consent obtained.    Speculum placed in vagina and excellent visualization of cervix achieved, cervix swabbed x 3 with acetic acid solution.    Findings:  Cervix: no visible lesions; cervix swabbed with Lugol's solution, SCJ visualized 360 degrees without lesions, endocervical curettage performed and specimen labelled and sent to pathology.  Vaginal inspection: normal without visible lesions.  Vulvar colposcopy: acetic acid applied o vulvar tissue and normal mucosa without lesions.    Specimens: Endocervical curettage    Complications: none.    Plan:  Specimens labelled and sent to Pathology.  Will base further treatment on Pathology findings.  Treatment options discussed with patient.  Post biopsy instructions given to patient.  Call to discuss Pathology results in 1 week.  Return to the office in 6 months for repeat Pap

## 2018-04-16 LAB
DX ICD CODE: NORMAL
DX ICD CODE: NORMAL
PATH REPORT.FINAL DX SPEC: NORMAL
PATH REPORT.GROSS SPEC: NORMAL
PATH REPORT.SITE OF ORIGIN SPEC: NORMAL
PATHOLOGIST NAME: NORMAL
PAYMENT PROCEDURE: NORMAL

## 2018-04-17 ENCOUNTER — TELEPHONE (OUTPATIENT)
Dept: OBSTETRICS AND GYNECOLOGY | Facility: CLINIC | Age: 53
End: 2018-04-17

## 2018-04-17 NOTE — TELEPHONE ENCOUNTER
----- Message from Lb Price MD sent at 4/17/2018 10:11 AM EDT -----  Repeat colposcopy in 3 months for positive ECC  ----- Message -----  From: [facility] Reflab Results In  Sent: 4/16/2018   5:10 PM  To: Lb Price MD

## 2018-06-28 ENCOUNTER — OFFICE VISIT (OUTPATIENT)
Dept: FAMILY MEDICINE CLINIC | Facility: CLINIC | Age: 53
End: 2018-06-28

## 2018-06-28 VITALS
HEART RATE: 70 BPM | SYSTOLIC BLOOD PRESSURE: 104 MMHG | RESPIRATION RATE: 16 BRPM | BODY MASS INDEX: 26.21 KG/M2 | DIASTOLIC BLOOD PRESSURE: 68 MMHG | WEIGHT: 167 LBS | HEIGHT: 67 IN | TEMPERATURE: 98.2 F

## 2018-06-28 DIAGNOSIS — E03.9 ACQUIRED HYPOTHYROIDISM: ICD-10-CM

## 2018-06-28 DIAGNOSIS — F33.42 MAJOR DEPRESSIVE DISORDER, RECURRENT EPISODE, IN FULL REMISSION (HCC): ICD-10-CM

## 2018-06-28 DIAGNOSIS — F51.01 PRIMARY INSOMNIA: ICD-10-CM

## 2018-06-28 DIAGNOSIS — F43.0 ACUTE REACTION TO STRESS: ICD-10-CM

## 2018-06-28 DIAGNOSIS — E78.2 MIXED HYPERLIPIDEMIA: ICD-10-CM

## 2018-06-28 PROCEDURE — 99214 OFFICE O/P EST MOD 30 MIN: CPT | Performed by: FAMILY MEDICINE

## 2018-06-28 RX ORDER — ALPRAZOLAM 0.5 MG/1
0.5 TABLET ORAL 2 TIMES DAILY
Qty: 60 TABLET | Refills: 2 | Status: SHIPPED | OUTPATIENT
Start: 2018-06-28 | End: 2018-09-20 | Stop reason: SDUPTHER

## 2018-06-28 RX ORDER — FLUOXETINE HYDROCHLORIDE 20 MG/1
CAPSULE ORAL
Qty: 60 CAPSULE | Refills: 5 | Status: SHIPPED | OUTPATIENT
Start: 2018-06-28 | End: 2018-12-18 | Stop reason: SDUPTHER

## 2018-06-28 RX ORDER — FENOFIBRATE 90 MG/1
90 CAPSULE ORAL DAILY
Qty: 30 CAPSULE | Refills: 5 | Status: SHIPPED | OUTPATIENT
Start: 2018-06-28 | End: 2018-12-18 | Stop reason: SDUPTHER

## 2018-06-28 RX ORDER — LEVOTHYROXINE SODIUM 0.03 MG/1
25 TABLET ORAL DAILY
Qty: 30 TABLET | Refills: 5 | Status: SHIPPED | OUTPATIENT
Start: 2018-06-28 | End: 2018-12-18 | Stop reason: SDUPTHER

## 2018-06-28 RX ORDER — ZOLPIDEM TARTRATE 10 MG/1
10 TABLET ORAL NIGHTLY PRN
Qty: 30 TABLET | Refills: 2 | Status: SHIPPED | OUTPATIENT
Start: 2018-06-28 | End: 2018-09-20 | Stop reason: SDUPTHER

## 2018-06-28 NOTE — PROGRESS NOTES
"Subjective   Willie Enciso is a 52 y.o. female.     History of Present Illness     Chief Complaint:   Chief Complaint   Patient presents with   • Insomnia     med refill joão    • Hyperlipidemia   • Depression       Willie Enciso 52 y.o. female who presents today for Medical Management of the below listed issues and medication refills.  she has a problem list of   Patient Active Problem List   Diagnosis   • Hyperlipidemia   • Hypothyroidism   • Insomnia   • Lateral epicondylitis   • Major depressive disorder, recurrent episode, in full remission   • Acute reaction to stress   .  Since the last visit, she has overall felt well.  she has been compliant with   Current Outpatient Prescriptions:   •  ALPRAZolam (XANAX) 0.5 MG tablet, Take 1 tablet by mouth 2 (Two) Times a Day., Disp: 60 tablet, Rfl: 2  •  ANTARA 90 MG capsule, Take 90 mg by mouth Daily., Disp: 30 capsule, Rfl: 5  •  FLUoxetine (PROzac) 20 MG capsule, Take 2 tablets QD, Disp: 60 capsule, Rfl: 5  •  levothyroxine (SYNTHROID, LEVOTHROID) 25 MCG tablet, Take 1 tablet by mouth Daily., Disp: 30 tablet, Rfl: 5  •  zolpidem (AMBIEN) 10 MG tablet, Take 1 tablet by mouth At Night As Needed for Sleep., Disp: 30 tablet, Rfl: 2.  she denies medication side effects.    All of the chronic condition(s) listed above are stable w/o issues.    /68   Pulse 70   Temp 98.2 °F (36.8 °C) (Oral)   Resp 16   Ht 170.2 cm (67\")   Wt 75.8 kg (167 lb)   BMI 26.16 kg/m²     Results for orders placed or performed in visit on 04/12/18   Reference Histopathology   Result Value Ref Range    Conv .conv Comment     . Comment     . Comment     . Comment     . Comment     . Comment     . Comment            The following portions of the patient's history were reviewed and updated as appropriate: allergies, current medications, past family history, past medical history, past social history, past surgical history and problem list.    Review of Systems   Constitutional: Negative for " activity change, chills, fatigue and fever.   Respiratory: Negative for cough and chest tightness.    Cardiovascular: Negative for chest pain and palpitations.   Gastrointestinal: Negative for abdominal pain and nausea.   Endocrine: Negative for cold intolerance.   Psychiatric/Behavioral: Negative for behavioral problems and dysphoric mood.       Objective   Physical Exam   Constitutional: She appears well-developed and well-nourished.   Neck: Neck supple. No thyromegaly present.   Cardiovascular: Normal rate and regular rhythm.    No murmur heard.  Pulmonary/Chest: Effort normal and breath sounds normal.   Abdominal: Bowel sounds are normal. There is no tenderness.   Neurological: She is alert.   Psychiatric: She has a normal mood and affect. Her behavior is normal.   Nursing note and vitals reviewed.    The patient has read and signed the [facility] Controlled Substance Contract.  I will continue to see patient for regular follow up appointments.  They are well controlled on their medication.  CHIQUI has been reviewed by me and is updated every 3 months. The patient is aware of the potential for addiction and dependence.    Assessment/Plan   Willie was seen today for insomnia, hyperlipidemia and depression.    Diagnoses and all orders for this visit:    Acute reaction to stress  -     ALPRAZolam (XANAX) 0.5 MG tablet; Take 1 tablet by mouth 2 (Two) Times a Day.    Mixed hyperlipidemia  -     ANTARA 90 MG capsule; Take 90 mg by mouth Daily.    Major depressive disorder, recurrent episode, in full remission  -     FLUoxetine (PROzac) 20 MG capsule; Take 2 tablets QD    Acquired hypothyroidism  -     levothyroxine (SYNTHROID, LEVOTHROID) 25 MCG tablet; Take 1 tablet by mouth Daily.    Primary insomnia  -     zolpidem (AMBIEN) 10 MG tablet; Take 1 tablet by mouth At Night As Needed for Sleep.

## 2018-07-12 ENCOUNTER — PROCEDURE VISIT (OUTPATIENT)
Dept: OBSTETRICS AND GYNECOLOGY | Facility: CLINIC | Age: 53
End: 2018-07-12

## 2018-07-12 VITALS — HEIGHT: 67 IN | BODY MASS INDEX: 26.37 KG/M2 | WEIGHT: 168 LBS

## 2018-07-12 DIAGNOSIS — N87.0 DYSPLASIA OF CERVIX, LOW GRADE (CIN 1): Primary | ICD-10-CM

## 2018-07-12 PROCEDURE — 57456 ENDOCERV CURETTAGE W/SCOPE: CPT | Performed by: OBSTETRICS & GYNECOLOGY

## 2018-07-12 NOTE — PROGRESS NOTES
Procedure   Procedures    Colposcopy Procedure Note    Indications: Pap smear 4 months ago showed: ASCUS with POSITIVE high risk HPV. The prior pap showed no abnormalities.  Prior cervical/vaginal disease: GILBERTO 1 found on ECC. Prior cervical treatment: no treatment.    Procedure Details   The risks and benefits of the procedure and Verbal informed consent obtained.    Speculum placed in vagina and excellent visualization of cervix achieved, cervix swabbed x 3 with acetic acid solution.    Findings:  Cervix: no visible lesions; endocervical curettage performed and specimen labelled and sent to pathology.  Vaginal inspection: normal without visible lesions.  Vulvar colposcopy: vulvar colposcopy not performed.    Specimens: ECC    Complications: none.    Plan:  Specimens labelled and sent to Pathology.  Will base further treatment on Pathology findings.  Treatment options discussed with patient.  Post biopsy instructions given to patient.  Call to discuss Pathology results in 1 week.  The patient tolerated the procedure well

## 2018-07-16 ENCOUNTER — TELEPHONE (OUTPATIENT)
Dept: OBSTETRICS AND GYNECOLOGY | Facility: CLINIC | Age: 53
End: 2018-07-16

## 2018-07-16 NOTE — TELEPHONE ENCOUNTER
----- Message from Lb Price MD sent at 7/16/2018  9:49 AM EDT -----  Tell the patient her biopsy was negative and she needs repeat Pap smear in 6 months  ----- Message -----  From: [facility] Reflab Results In  Sent: 7/15/2018   4:44 PM  To: Lb Price MD

## 2018-09-06 ENCOUNTER — TELEPHONE (OUTPATIENT)
Dept: FAMILY MEDICINE CLINIC | Facility: CLINIC | Age: 53
End: 2018-09-06

## 2018-09-06 DIAGNOSIS — E03.9 ACQUIRED HYPOTHYROIDISM: ICD-10-CM

## 2018-09-06 DIAGNOSIS — E78.2 MIXED HYPERLIPIDEMIA: Primary | ICD-10-CM

## 2018-09-15 LAB
ALBUMIN SERPL-MCNC: 4.4 G/DL (ref 3.5–5.5)
ALBUMIN/GLOB SERPL: 2.3 {RATIO} (ref 1.2–2.2)
ALP SERPL-CCNC: 60 IU/L (ref 39–117)
ALT SERPL-CCNC: 12 IU/L (ref 0–32)
AST SERPL-CCNC: 14 IU/L (ref 0–40)
BASOPHILS # BLD AUTO: 0.1 X10E3/UL (ref 0–0.2)
BASOPHILS NFR BLD AUTO: 1 %
BILIRUB SERPL-MCNC: 0.3 MG/DL (ref 0–1.2)
BUN SERPL-MCNC: 16 MG/DL (ref 6–24)
BUN/CREAT SERPL: 19 (ref 9–23)
CALCIUM SERPL-MCNC: 9.4 MG/DL (ref 8.7–10.2)
CHLORIDE SERPL-SCNC: 104 MMOL/L (ref 96–106)
CHOLEST SERPL-MCNC: 214 MG/DL (ref 100–199)
CO2 SERPL-SCNC: 19 MMOL/L (ref 20–29)
CREAT SERPL-MCNC: 0.85 MG/DL (ref 0.57–1)
EOSINOPHIL # BLD AUTO: 0.2 X10E3/UL (ref 0–0.4)
EOSINOPHIL NFR BLD AUTO: 2 %
ERYTHROCYTE [DISTWIDTH] IN BLOOD BY AUTOMATED COUNT: 13.9 % (ref 12.3–15.4)
GLOBULIN SER CALC-MCNC: 1.9 G/DL (ref 1.5–4.5)
GLUCOSE SERPL-MCNC: 96 MG/DL (ref 65–99)
HCT VFR BLD AUTO: 43.2 % (ref 34–46.6)
HDLC SERPL-MCNC: 46 MG/DL
HGB BLD-MCNC: 14.7 G/DL (ref 11.1–15.9)
IMM GRANULOCYTES # BLD: 0 X10E3/UL (ref 0–0.1)
IMM GRANULOCYTES NFR BLD: 0 %
LDLC SERPL CALC-MCNC: 146 MG/DL (ref 0–99)
LDLC/HDLC SERPL: 3.2 RATIO (ref 0–3.2)
LYMPHOCYTES # BLD AUTO: 2.3 X10E3/UL (ref 0.7–3.1)
LYMPHOCYTES NFR BLD AUTO: 35 %
MCH RBC QN AUTO: 30.9 PG (ref 26.6–33)
MCHC RBC AUTO-ENTMCNC: 34 G/DL (ref 31.5–35.7)
MCV RBC AUTO: 91 FL (ref 79–97)
MONOCYTES # BLD AUTO: 0.4 X10E3/UL (ref 0.1–0.9)
MONOCYTES NFR BLD AUTO: 6 %
NEUTROPHILS # BLD AUTO: 3.7 X10E3/UL (ref 1.4–7)
NEUTROPHILS NFR BLD AUTO: 56 %
PLATELET # BLD AUTO: 283 X10E3/UL (ref 150–379)
POTASSIUM SERPL-SCNC: 5.3 MMOL/L (ref 3.5–5.2)
PROT SERPL-MCNC: 6.3 G/DL (ref 6–8.5)
RBC # BLD AUTO: 4.76 X10E6/UL (ref 3.77–5.28)
SODIUM SERPL-SCNC: 141 MMOL/L (ref 134–144)
T4 FREE SERPL-MCNC: 1.22 NG/DL (ref 0.82–1.77)
TRIGL SERPL-MCNC: 110 MG/DL (ref 0–149)
TSH SERPL DL<=0.005 MIU/L-ACNC: 3.76 UIU/ML (ref 0.45–4.5)
VLDLC SERPL CALC-MCNC: 22 MG/DL (ref 5–40)
WBC # BLD AUTO: 6.6 X10E3/UL (ref 3.4–10.8)

## 2018-09-20 ENCOUNTER — OFFICE VISIT (OUTPATIENT)
Dept: FAMILY MEDICINE CLINIC | Facility: CLINIC | Age: 53
End: 2018-09-20

## 2018-09-20 VITALS
HEART RATE: 72 BPM | WEIGHT: 168 LBS | SYSTOLIC BLOOD PRESSURE: 116 MMHG | HEIGHT: 67 IN | RESPIRATION RATE: 16 BRPM | DIASTOLIC BLOOD PRESSURE: 68 MMHG | BODY MASS INDEX: 26.37 KG/M2 | TEMPERATURE: 98.5 F

## 2018-09-20 DIAGNOSIS — F43.0 ACUTE REACTION TO STRESS: ICD-10-CM

## 2018-09-20 DIAGNOSIS — F51.01 PRIMARY INSOMNIA: ICD-10-CM

## 2018-09-20 PROCEDURE — 99213 OFFICE O/P EST LOW 20 MIN: CPT | Performed by: FAMILY MEDICINE

## 2018-09-20 RX ORDER — ALPRAZOLAM 0.5 MG/1
0.5 TABLET ORAL 2 TIMES DAILY
Qty: 60 TABLET | Refills: 2 | Status: SHIPPED | OUTPATIENT
Start: 2018-09-20 | End: 2018-12-18 | Stop reason: SDUPTHER

## 2018-09-20 RX ORDER — ZOLPIDEM TARTRATE 10 MG/1
10 TABLET ORAL NIGHTLY PRN
Qty: 30 TABLET | Refills: 2 | Status: SHIPPED | OUTPATIENT
Start: 2018-09-20 | End: 2018-12-18 | Stop reason: SDUPTHER

## 2018-09-20 NOTE — PROGRESS NOTES
"Subjective   Willie Enciso is a 52 y.o. female.     History of Present Illness     Chief Complaint:   Chief Complaint   Patient presents with   • Insomnia     med refill  - joão cai bp done today  - lab results   • Stress       Willie Enciso 52 y.o. female who presents today for Medical Management of the below listed issues and medication refills.  she has a problem list of   Patient Active Problem List   Diagnosis   • Hyperlipidemia   • Hypothyroidism   • Insomnia   • Lateral epicondylitis   • Major depressive disorder, recurrent episode, in full remission (CMS/HCC)   • Acute reaction to stress   .  Since the last visit, she has overall felt well.  she has been compliant with   Current Outpatient Prescriptions:   •  ALPRAZolam (XANAX) 0.5 MG tablet, Take 1 tablet by mouth 2 (Two) Times a Day., Disp: 60 tablet, Rfl: 2  •  ANTARA 90 MG capsule, Take 90 mg by mouth Daily., Disp: 30 capsule, Rfl: 5  •  FLUoxetine (PROzac) 20 MG capsule, Take 2 tablets QD, Disp: 60 capsule, Rfl: 5  •  levothyroxine (SYNTHROID, LEVOTHROID) 25 MCG tablet, Take 1 tablet by mouth Daily., Disp: 30 tablet, Rfl: 5  •  zolpidem (AMBIEN) 10 MG tablet, Take 1 tablet by mouth At Night As Needed for Sleep., Disp: 30 tablet, Rfl: 2.  she denies medication side effects.    All of the chronic condition(s) listed above are stable w/o issues.    /68   Pulse 72   Temp 98.5 °F (36.9 °C) (Oral)   Resp 16   Ht 170.2 cm (67\")   Wt 76.2 kg (168 lb)   BMI 26.31 kg/m²     Results for orders placed or performed in visit on 09/06/18   Comprehensive metabolic panel   Result Value Ref Range    Glucose 96 65 - 99 mg/dL    BUN 16 6 - 24 mg/dL    Creatinine 0.85 0.57 - 1.00 mg/dL    eGFR Non African Am 79 >59 mL/min/1.73    eGFR African Am 91 >59 mL/min/1.73    BUN/Creatinine Ratio 19 9 - 23    Sodium 141 134 - 144 mmol/L    Potassium 5.3 (H) 3.5 - 5.2 mmol/L    Chloride 104 96 - 106 mmol/L    Total CO2 19 (L) 20 - 29 mmol/L    Calcium 9.4 8.7 - " 10.2 mg/dL    Total Protein 6.3 6.0 - 8.5 g/dL    Albumin 4.4 3.5 - 5.5 g/dL    Globulin 1.9 1.5 - 4.5 g/dL    A/G Ratio 2.3 (H) 1.2 - 2.2    Total Bilirubin 0.3 0.0 - 1.2 mg/dL    Alkaline Phosphatase 60 39 - 117 IU/L    AST (SGOT) 14 0 - 40 IU/L    ALT (SGPT) 12 0 - 32 IU/L   Lipid Panel With LDL/HDL Ratio   Result Value Ref Range    Total Cholesterol 214 (H) 100 - 199 mg/dL    Triglycerides 110 0 - 149 mg/dL    HDL Cholesterol 46 >39 mg/dL    VLDL Cholesterol 22 5 - 40 mg/dL    LDL Cholesterol  146 (H) 0 - 99 mg/dL    LDL/HDL Ratio 3.2 0.0 - 3.2 ratio   TSH   Result Value Ref Range    TSH 3.760 0.450 - 4.500 uIU/mL   T4, Free   Result Value Ref Range    Free T4 1.22 0.82 - 1.77 ng/dL   CBC and Differential   Result Value Ref Range    WBC 6.6 3.4 - 10.8 x10E3/uL    RBC 4.76 3.77 - 5.28 x10E6/uL    Hemoglobin 14.7 11.1 - 15.9 g/dL    Hematocrit 43.2 34.0 - 46.6 %    MCV 91 79 - 97 fL    MCH 30.9 26.6 - 33.0 pg    MCHC 34.0 31.5 - 35.7 g/dL    RDW 13.9 12.3 - 15.4 %    Platelets 283 150 - 379 x10E3/uL    Neutrophil Rel % 56 Not Estab. %    Lymphocyte Rel % 35 Not Estab. %    Monocyte Rel % 6 Not Estab. %    Eosinophil Rel % 2 Not Estab. %    Basophil Rel % 1 Not Estab. %    Neutrophils Absolute 3.7 1.4 - 7.0 x10E3/uL    Lymphocytes Absolute 2.3 0.7 - 3.1 x10E3/uL    Monocytes Absolute 0.4 0.1 - 0.9 x10E3/uL    Eosinophils Absolute 0.2 0.0 - 0.4 x10E3/uL    Basophils Absolute 0.1 0.0 - 0.2 x10E3/uL    Immature Granulocyte Rel % 0 Not Estab. %    Immature Grans Absolute 0.0 0.0 - 0.1 x10E3/uL           The following portions of the patient's history were reviewed and updated as appropriate: allergies, current medications, past family history, past medical history, past social history, past surgical history and problem list.    Review of Systems   Constitutional: Negative for activity change, chills, fatigue and fever.   Respiratory: Negative for cough and chest tightness.    Cardiovascular: Negative for chest pain and  palpitations.   Gastrointestinal: Negative for abdominal pain and nausea.   Endocrine: Negative for cold intolerance.   Psychiatric/Behavioral: Negative for behavioral problems and dysphoric mood.       Objective   Physical Exam   Constitutional: She appears well-developed and well-nourished.   Neck: Neck supple. No thyromegaly present.   Cardiovascular: Normal rate and regular rhythm.    No murmur heard.  Pulmonary/Chest: Effort normal and breath sounds normal.   Abdominal: Bowel sounds are normal. There is no tenderness.   Neurological: She is alert.   Psychiatric: She has a normal mood and affect. Her behavior is normal.   Nursing note and vitals reviewed.    The patient has read and signed the [facility] Controlled Substance Contract.  I will continue to see patient for regular follow up appointments.  They are well controlled on their medication.  CHIQUI has been reviewed by me and is updated every 3 months. The patient is aware of the potential for addiction and dependence.    Assessment/Plan   Willie was seen today for insomnia and stress.    Diagnoses and all orders for this visit:    Primary insomnia  -     zolpidem (AMBIEN) 10 MG tablet; Take 1 tablet by mouth At Night As Needed for Sleep.    Acute reaction to stress  -     ALPRAZolam (XANAX) 0.5 MG tablet; Take 1 tablet by mouth 2 (Two) Times a Day.

## 2018-12-18 ENCOUNTER — OFFICE VISIT (OUTPATIENT)
Dept: FAMILY MEDICINE CLINIC | Facility: CLINIC | Age: 53
End: 2018-12-18

## 2018-12-18 VITALS
HEART RATE: 66 BPM | RESPIRATION RATE: 14 BRPM | DIASTOLIC BLOOD PRESSURE: 74 MMHG | WEIGHT: 163 LBS | BODY MASS INDEX: 25.58 KG/M2 | TEMPERATURE: 98.2 F | SYSTOLIC BLOOD PRESSURE: 118 MMHG | HEIGHT: 67 IN

## 2018-12-18 DIAGNOSIS — F33.42 MAJOR DEPRESSIVE DISORDER, RECURRENT EPISODE, IN FULL REMISSION (HCC): ICD-10-CM

## 2018-12-18 DIAGNOSIS — F43.0 ACUTE REACTION TO STRESS: ICD-10-CM

## 2018-12-18 DIAGNOSIS — E78.2 MIXED HYPERLIPIDEMIA: ICD-10-CM

## 2018-12-18 DIAGNOSIS — E03.9 ACQUIRED HYPOTHYROIDISM: ICD-10-CM

## 2018-12-18 DIAGNOSIS — F51.01 PRIMARY INSOMNIA: ICD-10-CM

## 2018-12-18 PROCEDURE — 99214 OFFICE O/P EST MOD 30 MIN: CPT | Performed by: FAMILY MEDICINE

## 2018-12-18 RX ORDER — LEVOTHYROXINE SODIUM 0.03 MG/1
25 TABLET ORAL DAILY
Qty: 30 TABLET | Refills: 5 | Status: SHIPPED | OUTPATIENT
Start: 2018-12-18 | End: 2019-06-10 | Stop reason: SDUPTHER

## 2018-12-18 RX ORDER — ALPRAZOLAM 0.5 MG/1
0.5 TABLET ORAL 2 TIMES DAILY
Qty: 60 TABLET | Refills: 2 | Status: SHIPPED | OUTPATIENT
Start: 2018-12-18 | End: 2019-03-05 | Stop reason: SDUPTHER

## 2018-12-18 RX ORDER — FLUOXETINE HYDROCHLORIDE 20 MG/1
CAPSULE ORAL
Qty: 60 CAPSULE | Refills: 5 | Status: SHIPPED | OUTPATIENT
Start: 2018-12-18 | End: 2019-06-10 | Stop reason: SDUPTHER

## 2018-12-18 RX ORDER — ZOLPIDEM TARTRATE 10 MG/1
10 TABLET ORAL NIGHTLY PRN
Qty: 30 TABLET | Refills: 2 | Status: SHIPPED | OUTPATIENT
Start: 2018-12-18 | End: 2019-03-05 | Stop reason: SDUPTHER

## 2018-12-18 RX ORDER — FENOFIBRATE 90 MG/1
90 CAPSULE ORAL DAILY
Qty: 30 CAPSULE | Refills: 5 | Status: SHIPPED | OUTPATIENT
Start: 2018-12-18 | End: 2019-06-10 | Stop reason: SDUPTHER

## 2018-12-18 NOTE — PROGRESS NOTES
"Subjective   Willie Enciso is a 53 y.o. female.     History of Present Illness     Chief Complaint:   Chief Complaint   Patient presents with   • Insomnia     med refill - joão  - meds reviewed with pt today    • Hyperlipidemia   • Hypothyroidism   • Depression       Willie Enciso 53 y.o. female who presents today for Medical Management of the below listed issues and medication refills.  she has a problem list of   Patient Active Problem List   Diagnosis   • Hyperlipidemia   • Hypothyroidism   • Insomnia   • Major depressive disorder, recurrent episode, in full remission (CMS/HCC)   • Acute reaction to stress   .  Since the last visit, she has overall felt well.  she has been compliant with   Current Outpatient Medications:   •  ALPRAZolam (XANAX) 0.5 MG tablet, Take 1 tablet by mouth 2 (Two) Times a Day., Disp: 60 tablet, Rfl: 2  •  ANTARA 90 MG capsule, Take 90 mg by mouth Daily., Disp: 30 capsule, Rfl: 5  •  FLUoxetine (PROzac) 20 MG capsule, Take 2 tablets QD, Disp: 60 capsule, Rfl: 5  •  levothyroxine (SYNTHROID, LEVOTHROID) 25 MCG tablet, Take 1 tablet by mouth Daily., Disp: 30 tablet, Rfl: 5  •  zolpidem (AMBIEN) 10 MG tablet, Take 1 tablet by mouth At Night As Needed for Sleep., Disp: 30 tablet, Rfl: 2.  she denies medication side effects.    All of the chronic condition(s) listed above are stable w/o issues.    /74   Pulse 66   Temp 98.2 °F (36.8 °C) (Oral)   Resp 14   Ht 170.2 cm (67\")   Wt 73.9 kg (163 lb)   BMI 25.53 kg/m²     Results for orders placed or performed in visit on 09/06/18   Comprehensive metabolic panel   Result Value Ref Range    Glucose 96 65 - 99 mg/dL    BUN 16 6 - 24 mg/dL    Creatinine 0.85 0.57 - 1.00 mg/dL    eGFR Non African Am 79 >59 mL/min/1.73    eGFR African Am 91 >59 mL/min/1.73    BUN/Creatinine Ratio 19 9 - 23    Sodium 141 134 - 144 mmol/L    Potassium 5.3 (H) 3.5 - 5.2 mmol/L    Chloride 104 96 - 106 mmol/L    Total CO2 19 (L) 20 - 29 mmol/L    Calcium 9.4 " 8.7 - 10.2 mg/dL    Total Protein 6.3 6.0 - 8.5 g/dL    Albumin 4.4 3.5 - 5.5 g/dL    Globulin 1.9 1.5 - 4.5 g/dL    A/G Ratio 2.3 (H) 1.2 - 2.2    Total Bilirubin 0.3 0.0 - 1.2 mg/dL    Alkaline Phosphatase 60 39 - 117 IU/L    AST (SGOT) 14 0 - 40 IU/L    ALT (SGPT) 12 0 - 32 IU/L   Lipid Panel With LDL/HDL Ratio   Result Value Ref Range    Total Cholesterol 214 (H) 100 - 199 mg/dL    Triglycerides 110 0 - 149 mg/dL    HDL Cholesterol 46 >39 mg/dL    VLDL Cholesterol 22 5 - 40 mg/dL    LDL Cholesterol  146 (H) 0 - 99 mg/dL    LDL/HDL Ratio 3.2 0.0 - 3.2 ratio   TSH   Result Value Ref Range    TSH 3.760 0.450 - 4.500 uIU/mL   T4, Free   Result Value Ref Range    Free T4 1.22 0.82 - 1.77 ng/dL   CBC and Differential   Result Value Ref Range    WBC 6.6 3.4 - 10.8 x10E3/uL    RBC 4.76 3.77 - 5.28 x10E6/uL    Hemoglobin 14.7 11.1 - 15.9 g/dL    Hematocrit 43.2 34.0 - 46.6 %    MCV 91 79 - 97 fL    MCH 30.9 26.6 - 33.0 pg    MCHC 34.0 31.5 - 35.7 g/dL    RDW 13.9 12.3 - 15.4 %    Platelets 283 150 - 379 x10E3/uL    Neutrophil Rel % 56 Not Estab. %    Lymphocyte Rel % 35 Not Estab. %    Monocyte Rel % 6 Not Estab. %    Eosinophil Rel % 2 Not Estab. %    Basophil Rel % 1 Not Estab. %    Neutrophils Absolute 3.7 1.4 - 7.0 x10E3/uL    Lymphocytes Absolute 2.3 0.7 - 3.1 x10E3/uL    Monocytes Absolute 0.4 0.1 - 0.9 x10E3/uL    Eosinophils Absolute 0.2 0.0 - 0.4 x10E3/uL    Basophils Absolute 0.1 0.0 - 0.2 x10E3/uL    Immature Granulocyte Rel % 0 Not Estab. %    Immature Grans Absolute 0.0 0.0 - 0.1 x10E3/uL           The following portions of the patient's history were reviewed and updated as appropriate: allergies, current medications, past family history, past medical history, past social history, past surgical history and problem list.    Review of Systems   Constitutional: Negative for activity change, chills, fatigue and fever.   Respiratory: Negative for cough and chest tightness.    Cardiovascular: Negative for chest  pain and palpitations.   Gastrointestinal: Negative for abdominal pain and nausea.   Endocrine: Negative for cold intolerance.   Psychiatric/Behavioral: Negative for behavioral problems and dysphoric mood.       Objective   Physical Exam   Constitutional: She appears well-developed and well-nourished.   Neck: Neck supple. No thyromegaly present.   Cardiovascular: Normal rate and regular rhythm.   No murmur heard.  Pulmonary/Chest: Effort normal and breath sounds normal.   Abdominal: Bowel sounds are normal. There is no tenderness.   Neurological: She is alert.   Psychiatric: She has a normal mood and affect. Her behavior is normal.   Nursing note and vitals reviewed.    The patient has read and signed the [facility] Controlled Substance Contract.  I will continue to see patient for regular follow up appointments.  They are well controlled on their medication.  CHIQUI has been reviewed by me and is updated every 3 months. The patient is aware of the potential for addiction and dependence.    Assessment/Plan   Willie was seen today for insomnia, hyperlipidemia, hypothyroidism and depression.    Diagnoses and all orders for this visit:    Acute reaction to stress  -     ALPRAZolam (XANAX) 0.5 MG tablet; Take 1 tablet by mouth 2 (Two) Times a Day.    Primary insomnia  -     zolpidem (AMBIEN) 10 MG tablet; Take 1 tablet by mouth At Night As Needed for Sleep.    Mixed hyperlipidemia  -     ANTARA 90 MG capsule; Take 90 mg by mouth Daily.    Major depressive disorder, recurrent episode, in full remission (CMS/HCC)  -     FLUoxetine (PROzac) 20 MG capsule; Take 2 tablets QD    Acquired hypothyroidism  -     levothyroxine (SYNTHROID, LEVOTHROID) 25 MCG tablet; Take 1 tablet by mouth Daily.

## 2019-01-15 ENCOUNTER — OFFICE VISIT (OUTPATIENT)
Dept: OBSTETRICS AND GYNECOLOGY | Facility: CLINIC | Age: 54
End: 2019-01-15

## 2019-01-15 VITALS
BODY MASS INDEX: 26.53 KG/M2 | HEIGHT: 67 IN | DIASTOLIC BLOOD PRESSURE: 68 MMHG | SYSTOLIC BLOOD PRESSURE: 122 MMHG | WEIGHT: 169 LBS

## 2019-01-15 DIAGNOSIS — F17.200 TOBACCO DEPENDENCE: ICD-10-CM

## 2019-01-15 DIAGNOSIS — N87.0 DYSPLASIA OF CERVIX, LOW GRADE (CIN 1): Primary | ICD-10-CM

## 2019-01-15 PROCEDURE — 99213 OFFICE O/P EST LOW 20 MIN: CPT | Performed by: OBSTETRICS & GYNECOLOGY

## 2019-01-15 NOTE — PROGRESS NOTES
Subjective   Willie Enciso is a 53 y.o. female.     Cc:  Abnormal pap history    History of Present Illness - 53 year old female with history of abnormal pap with colposcopy proven GILBERTO I.  Patient here to follow up.  She has a long interval between pap abnormalities with this recent episode.  Patient states that she has been menopausal for 10 years.  In November, she had a 3 day episode of very light spotting, that she relates occurred when she was under extreme stress.  She has had no further bleeding or spotting.    The following portions of the patient's history were reviewed and updated as appropriate:   She  has a past medical history of Acute reaction to stress, Glaucoma, H/O complete eye exam (12/2016), Hyperlipidemia, Hypothyroidism, Insomnia, Lateral epicondylitis, and Major depressive disorder, recurrent episode, in full remission (CMS/[facility]).  She  reports that she has been smoking cigarettes.  She has a 17.50 pack-year smoking history. she has never used smokeless tobacco. She reports that she drinks about 5.4 oz of alcohol per week. She reports that she does not use drugs.  Current Outpatient Medications   Medication Sig Dispense Refill   • ALPRAZolam (XANAX) 0.5 MG tablet Take 1 tablet by mouth 2 (Two) Times a Day. 60 tablet 2   • ANTARA 90 MG capsule Take 90 mg by mouth Daily. 30 capsule 5   • FLUoxetine (PROzac) 20 MG capsule Take 2 tablets QD 60 capsule 5   • levothyroxine (SYNTHROID, LEVOTHROID) 25 MCG tablet Take 1 tablet by mouth Daily. 30 tablet 5   • zolpidem (AMBIEN) 10 MG tablet Take 1 tablet by mouth At Night As Needed for Sleep. 30 tablet 2     No current facility-administered medications for this visit.      She has No Known Allergies..    Review of Systems   Genitourinary: Positive for vaginal bleeding. Negative for menstrual problem, pelvic pain and vaginal pain.       Objective   Physical Exam   Constitutional: She appears well-developed and well-nourished.   Genitourinary: Vagina normal  and uterus normal. Pelvic exam was performed with patient supine. There is no tenderness or lesion on the right labia. There is no tenderness or lesion on the left labia. Cervix exhibits no motion tenderness, no discharge and no friability. Right adnexum displays no mass and no tenderness. Left adnexum displays no mass and no tenderness.   Psychiatric: She has a normal mood and affect. Her behavior is normal. Judgment and thought content normal.   Vitals reviewed.      Assessment/Plan   Willie was seen today for follow-up.    Diagnoses and all orders for this visit:    Dysplasia of cervix, low grade (GILBERTO 1)  -     IgP, Aptima HPV  -     If pap LGSIL or normal, ok to follow up for another pap in one year.    Tobacco dependence        -    Discussed importance of quitting and rationale to quit, in light of abnormal pap.  Patient is motivated to quit and has quit in past.    If patient has any further bleeding or spotting, she was instructed on importance of being seen.    Bakari Katz MD

## 2019-01-17 LAB
CYTOLOGIST CVX/VAG CYTO: NORMAL
CYTOLOGY CVX/VAG DOC THIN PREP: NORMAL
DX ICD CODE: NORMAL
HIV 1 & 2 AB SER-IMP: NORMAL
HPV I/H RISK 4 DNA CVX QL PROBE+SIG AMP: NEGATIVE
Lab: NORMAL
OTHER STN SPEC: NORMAL
PATH REPORT.FINAL DX SPEC: NORMAL
STAT OF ADQ CVX/VAG CYTO-IMP: NORMAL

## 2019-01-18 ENCOUNTER — TELEPHONE (OUTPATIENT)
Dept: OBSTETRICS AND GYNECOLOGY | Facility: CLINIC | Age: 54
End: 2019-01-18

## 2019-01-18 NOTE — TELEPHONE ENCOUNTER
Patient aware of results. 1-18-19/lw  ----- Message from Bakari Katz MD sent at 1/17/2019 10:42 PM EST -----  LAW - Let her know that her pap was normal.  She should follow up in one year.

## 2019-03-05 ENCOUNTER — OFFICE VISIT (OUTPATIENT)
Dept: FAMILY MEDICINE CLINIC | Facility: CLINIC | Age: 54
End: 2019-03-05

## 2019-03-05 VITALS
SYSTOLIC BLOOD PRESSURE: 124 MMHG | HEART RATE: 56 BPM | DIASTOLIC BLOOD PRESSURE: 82 MMHG | OXYGEN SATURATION: 100 % | TEMPERATURE: 98 F | RESPIRATION RATE: 16 BRPM | BODY MASS INDEX: 26.53 KG/M2 | HEIGHT: 67 IN | WEIGHT: 169 LBS

## 2019-03-05 DIAGNOSIS — F51.01 PRIMARY INSOMNIA: ICD-10-CM

## 2019-03-05 DIAGNOSIS — F43.0 ACUTE REACTION TO STRESS: ICD-10-CM

## 2019-03-05 PROCEDURE — 99213 OFFICE O/P EST LOW 20 MIN: CPT | Performed by: FAMILY MEDICINE

## 2019-03-05 RX ORDER — ZOLPIDEM TARTRATE 10 MG/1
10 TABLET ORAL NIGHTLY PRN
Qty: 30 TABLET | Refills: 2 | Status: SHIPPED | OUTPATIENT
Start: 2019-03-05 | End: 2019-06-10 | Stop reason: SDUPTHER

## 2019-03-05 RX ORDER — ALPRAZOLAM 0.5 MG/1
0.5 TABLET ORAL 2 TIMES DAILY
Qty: 60 TABLET | Refills: 2 | Status: SHIPPED | OUTPATIENT
Start: 2019-03-05 | End: 2019-06-10 | Stop reason: SDUPTHER

## 2019-03-05 NOTE — PROGRESS NOTES
"Subjective   Willie Enciso is a 53 y.o. female.     History of Present Illness     Chief Complaint:   Chief Complaint   Patient presents with   • Anxiety     med refill - joão cai bp done today    • Insomnia       Willie Enciso 53 y.o. female who presents today for Medical Management of the below listed issues and medication refills.  she has a problem list of   Patient Active Problem List   Diagnosis   • Hyperlipidemia   • Hypothyroidism   • Insomnia   • Major depressive disorder, recurrent episode, in full remission (CMS/HCC)   • Acute reaction to stress   .  Since the last visit, she has overall felt well. She is currently quitting tobacco an dis down to 5/day. Trying to quit by May 1st.  she has been compliant with   Current Outpatient Medications:   •  ALPRAZolam (XANAX) 0.5 MG tablet, Take 1 tablet by mouth 2 (Two) Times a Day., Disp: 60 tablet, Rfl: 2  •  zolpidem (AMBIEN) 10 MG tablet, Take 1 tablet by mouth At Night As Needed for Sleep., Disp: 30 tablet, Rfl: 2  •  ANTARA 90 MG capsule, Take 90 mg by mouth Daily., Disp: 30 capsule, Rfl: 5  •  FLUoxetine (PROzac) 20 MG capsule, Take 2 tablets QD, Disp: 60 capsule, Rfl: 5  •  levothyroxine (SYNTHROID, LEVOTHROID) 25 MCG tablet, Take 1 tablet by mouth Daily., Disp: 30 tablet, Rfl: 5.  she denies medication side effects.    All of the chronic condition(s) listed above are stable w/o issues.    /82   Pulse 56   Temp 98 °F (36.7 °C) (Oral)   Resp 16   Ht 170.2 cm (67\")   Wt 76.7 kg (169 lb)   SpO2 100%   BMI 26.47 kg/m²     Results for orders placed or performed in visit on 01/15/19   IgP, Aptima HPV   Result Value Ref Range    Diagnosis Comment     Specimen adequacy: Comment     Clinician Provided ICD-10: Comment     Performed by: Comment     QC reviewed by: Comment     . .     Note: Comment     Method: Comment     HPV Aptima Negative Negative           The following portions of the patient's history were reviewed and updated as appropriate: " allergies, current medications, past family history, past medical history, past social history, past surgical history and problem list.    Review of Systems   Constitutional: Negative for activity change, chills, fatigue and fever.   Respiratory: Negative for cough and chest tightness.    Cardiovascular: Negative for chest pain and palpitations.   Gastrointestinal: Negative for abdominal pain and nausea.   Endocrine: Negative for cold intolerance.   Psychiatric/Behavioral: Negative for behavioral problems and dysphoric mood.       Objective   Physical Exam   Constitutional: She appears well-developed and well-nourished.   Neck: Neck supple. No thyromegaly present.   Cardiovascular: Normal rate and regular rhythm.   No murmur heard.  Pulmonary/Chest: Effort normal and breath sounds normal.   Abdominal: Bowel sounds are normal. There is no tenderness.   Neurological: She is alert.   Psychiatric: She has a normal mood and affect. Her behavior is normal.   Nursing note and vitals reviewed.    The patient has read and signed the [facility] Controlled Substance Contract.  I will continue to see patient for regular follow up appointments.  They are well controlled on their medication.  CHIQUI has been reviewed by me and is updated every 3 months. The patient is aware of the potential for addiction and dependence.    Assessment/Plan   Willie was seen today for anxiety and insomnia.    Diagnoses and all orders for this visit:    Acute reaction to stress  -     ALPRAZolam (XANAX) 0.5 MG tablet; Take 1 tablet by mouth 2 (Two) Times a Day.    Primary insomnia  -     zolpidem (AMBIEN) 10 MG tablet; Take 1 tablet by mouth At Night As Needed for Sleep.

## 2019-03-18 ENCOUNTER — OFFICE VISIT (OUTPATIENT)
Dept: FAMILY MEDICINE CLINIC | Facility: CLINIC | Age: 54
End: 2019-03-18

## 2019-03-18 VITALS
BODY MASS INDEX: 26.06 KG/M2 | WEIGHT: 166 LBS | DIASTOLIC BLOOD PRESSURE: 83 MMHG | RESPIRATION RATE: 16 BRPM | SYSTOLIC BLOOD PRESSURE: 163 MMHG | TEMPERATURE: 99.2 F | OXYGEN SATURATION: 100 % | HEIGHT: 67 IN | HEART RATE: 64 BPM

## 2019-03-18 DIAGNOSIS — R30.0 DYSURIA: Primary | ICD-10-CM

## 2019-03-18 LAB
BILIRUB BLD-MCNC: NEGATIVE MG/DL
CLARITY, POC: CLEAR
COLOR UR: YELLOW
GLUCOSE UR STRIP-MCNC: NEGATIVE MG/DL
KETONES UR QL: NEGATIVE
LEUKOCYTE EST, POC: NEGATIVE
NITRITE UR-MCNC: NEGATIVE MG/ML
PH UR: 6 [PH] (ref 5–8)
PROT UR STRIP-MCNC: NEGATIVE MG/DL
RBC # UR STRIP: ABNORMAL /UL
SP GR UR: 1.01 (ref 1–1.03)
UROBILINOGEN UR QL: NORMAL

## 2019-03-18 PROCEDURE — 81003 URINALYSIS AUTO W/O SCOPE: CPT | Performed by: FAMILY MEDICINE

## 2019-03-18 PROCEDURE — 99213 OFFICE O/P EST LOW 20 MIN: CPT | Performed by: FAMILY MEDICINE

## 2019-03-18 RX ORDER — CIPROFLOXACIN 500 MG/1
500 TABLET, FILM COATED ORAL 2 TIMES DAILY
Qty: 14 TABLET | Refills: 0 | Status: SHIPPED | OUTPATIENT
Start: 2019-03-18 | End: 2019-06-10

## 2019-03-18 NOTE — PROGRESS NOTES
"Subjective   Willie Enciso is a 53 y.o. female.     CC: Dysuria/Back Pain    History of Present Illness     Pt comes in today reporting 3 day h/o some left flank pain, felt well last PM, but awakened this AM with increased pain. Had similar sx in college and had a kidney infection then. Mild dysuria noted. No f/c/n/v. Increased frequency, too.  Water seems to help.  PMH: no stones  FH: no stones    The following portions of the patient's history were reviewed and updated as appropriate: allergies, current medications, past family history, past medical history, past social history, past surgical history and problem list.    Review of Systems   Constitutional: Negative for activity change, chills, fatigue and fever.   Respiratory: Negative for cough and chest tightness.    Cardiovascular: Negative for chest pain and palpitations.   Gastrointestinal: Negative for abdominal pain and nausea.   Endocrine: Negative for cold intolerance.   Genitourinary: Positive for dysuria and frequency.   Musculoskeletal:        Left flank pain   Psychiatric/Behavioral: Negative for behavioral problems and dysphoric mood.       /83   Pulse 64   Temp 99.2 °F (37.3 °C) (Oral)   Resp 16   Ht 170.2 cm (67\")   Wt 75.3 kg (166 lb)   SpO2 100%   BMI 26.00 kg/m²     Objective   Physical Exam   Constitutional: She appears well-developed and well-nourished.   Neck: Neck supple. No thyromegaly present.   Cardiovascular: Normal rate and regular rhythm.   No murmur heard.  Pulmonary/Chest: Effort normal and breath sounds normal.   Abdominal: Bowel sounds are normal. There is no tenderness.   Musculoskeletal: She exhibits no tenderness (no CVA tenderness).   Neurological: She is alert.   Psychiatric: She has a normal mood and affect. Her behavior is normal.   Nursing note and vitals reviewed.  U/A: mild blood.    Assessment/Plan   Willie was seen today for low back pain and burning with urination.    Diagnoses and all orders for this " visit:    Dysuria  -     POC Urinalysis Dipstick, Automated  -     ciprofloxacin (CIPRO) 500 MG tablet; Take 1 tablet by mouth 2 (Two) Times a Day.    Discussed stones. Pt feeling better now than this AM. Heat/NSADIS/rest and ED if worse.

## 2019-06-10 ENCOUNTER — OFFICE VISIT (OUTPATIENT)
Dept: FAMILY MEDICINE CLINIC | Facility: CLINIC | Age: 54
End: 2019-06-10

## 2019-06-10 VITALS
HEART RATE: 54 BPM | TEMPERATURE: 98.1 F | WEIGHT: 172 LBS | OXYGEN SATURATION: 99 % | RESPIRATION RATE: 14 BRPM | DIASTOLIC BLOOD PRESSURE: 82 MMHG | HEIGHT: 67 IN | SYSTOLIC BLOOD PRESSURE: 126 MMHG | BODY MASS INDEX: 27 KG/M2

## 2019-06-10 DIAGNOSIS — E03.9 ACQUIRED HYPOTHYROIDISM: ICD-10-CM

## 2019-06-10 DIAGNOSIS — F33.42 MAJOR DEPRESSIVE DISORDER, RECURRENT EPISODE, IN FULL REMISSION (HCC): ICD-10-CM

## 2019-06-10 DIAGNOSIS — F43.0 ACUTE REACTION TO STRESS: ICD-10-CM

## 2019-06-10 DIAGNOSIS — E78.2 MIXED HYPERLIPIDEMIA: ICD-10-CM

## 2019-06-10 DIAGNOSIS — F51.01 PRIMARY INSOMNIA: ICD-10-CM

## 2019-06-10 PROCEDURE — 99214 OFFICE O/P EST MOD 30 MIN: CPT | Performed by: FAMILY MEDICINE

## 2019-06-10 RX ORDER — FENOFIBRATE 90 MG/1
90 CAPSULE ORAL DAILY
Qty: 30 CAPSULE | Refills: 5 | Status: SHIPPED | OUTPATIENT
Start: 2019-06-10 | End: 2019-11-25 | Stop reason: SDUPTHER

## 2019-06-10 RX ORDER — FLUOXETINE HYDROCHLORIDE 20 MG/1
CAPSULE ORAL
Qty: 60 CAPSULE | Refills: 5 | Status: SHIPPED | OUTPATIENT
Start: 2019-06-10 | End: 2019-11-25 | Stop reason: SDUPTHER

## 2019-06-10 RX ORDER — ZOLPIDEM TARTRATE 10 MG/1
10 TABLET ORAL NIGHTLY PRN
Qty: 30 TABLET | Refills: 2 | Status: SHIPPED | OUTPATIENT
Start: 2019-06-10 | End: 2019-09-04 | Stop reason: SDUPTHER

## 2019-06-10 RX ORDER — LEVOTHYROXINE SODIUM 0.03 MG/1
25 TABLET ORAL DAILY
Qty: 30 TABLET | Refills: 5 | Status: SHIPPED | OUTPATIENT
Start: 2019-06-10 | End: 2019-09-09 | Stop reason: DRUGHIGH

## 2019-06-10 RX ORDER — ALPRAZOLAM 0.5 MG/1
0.5 TABLET ORAL 2 TIMES DAILY
Qty: 60 TABLET | Refills: 2 | Status: SHIPPED | OUTPATIENT
Start: 2019-06-10 | End: 2019-09-04 | Stop reason: SDUPTHER

## 2019-06-10 NOTE — PROGRESS NOTES
"Subjective   Willie Enciso is a 53 y.o. female.     History of Present Illness     Chief Complaint:   Chief Complaint   Patient presents with   • Hyperlipidemia     med refill joão    • Hypothyroidism   • Insomnia   • Anxiety       Willie Enciso 53 y.o. female who presents today for Medical Management of the below listed issues and medication refills.  she has a problem list of   Patient Active Problem List   Diagnosis   • Hyperlipidemia   • Hypothyroidism   • Insomnia   • Major depressive disorder, recurrent episode, in full remission (CMS/HCC)   • Acute reaction to stress   .  Since the last visit, she has overall felt well and has been smoke-free x 41 days!  she has been compliant with   Current Outpatient Medications:   •  ALPRAZolam (XANAX) 0.5 MG tablet, Take 1 tablet by mouth 2 (Two) Times a Day., Disp: 60 tablet, Rfl: 2  •  ANTARA 90 MG capsule, Take 90 mg by mouth Daily., Disp: 30 capsule, Rfl: 5  •  FLUoxetine (PROzac) 20 MG capsule, Take 2 tablets QD, Disp: 60 capsule, Rfl: 5  •  levothyroxine (SYNTHROID, LEVOTHROID) 25 MCG tablet, Take 1 tablet by mouth Daily., Disp: 30 tablet, Rfl: 5  •  zolpidem (AMBIEN) 10 MG tablet, Take 1 tablet by mouth At Night As Needed for Sleep., Disp: 30 tablet, Rfl: 2.  she denies medication side effects.    All of the chronic condition(s) listed above are stable w/o issues.    /82   Pulse 54   Temp 98.1 °F (36.7 °C) (Oral)   Resp 14   Ht 170.2 cm (67\")   Wt 78 kg (172 lb)   SpO2 99%   BMI 26.94 kg/m²     Results for orders placed or performed in visit on 03/18/19   POC Urinalysis Dipstick, Automated   Result Value Ref Range    Color Yellow Yellow, Straw, Dark Yellow, Delphine    Clarity, UA Clear Clear    Specific Gravity  1.010 1.005 - 1.030    pH, Urine 6.0 5.0 - 8.0    Leukocytes Negative Negative    Nitrite, UA Negative Negative    Protein, POC Negative Negative mg/dL    Glucose, UA Negative Negative, 1000 mg/dL (3+) mg/dL    Ketones, UA Negative Negative    " Urobilinogen, UA Normal Normal    Bilirubin Negative Negative    Blood, UA Trace (A) Negative           The following portions of the patient's history were reviewed and updated as appropriate: allergies, current medications, past family history, past medical history, past social history, past surgical history and problem list.    Review of Systems   Constitutional: Negative for activity change, chills, fatigue and fever.   Respiratory: Negative for cough and chest tightness.    Cardiovascular: Negative for chest pain and palpitations.   Gastrointestinal: Negative for abdominal pain and nausea.   Endocrine: Negative for cold intolerance.   Psychiatric/Behavioral: Negative for behavioral problems and dysphoric mood.       Objective   Physical Exam   Constitutional: She appears well-developed and well-nourished.   Neck: Neck supple. No thyromegaly present.   Cardiovascular: Normal rate and regular rhythm.   No murmur heard.  Pulmonary/Chest: Effort normal and breath sounds normal.   Abdominal: Bowel sounds are normal. There is no tenderness.   Neurological: She is alert.   Psychiatric: She has a normal mood and affect. Her behavior is normal.   Nursing note and vitals reviewed.    The patient has read and signed the [facility] Controlled Substance Contract.  I will continue to see patient for regular follow up appointments.  They are well controlled on their medication.  CHIQUI has been reviewed by me and is updated every 3 months. The patient is aware of the potential for addiction and dependence.    Assessment/Plan   Willie was seen today for hyperlipidemia, hypothyroidism, insomnia and anxiety.    Diagnoses and all orders for this visit:    Acute reaction to stress  -     ALPRAZolam (XANAX) 0.5 MG tablet; Take 1 tablet by mouth 2 (Two) Times a Day.    Mixed hyperlipidemia  -     ANTARA 90 MG capsule; Take 90 mg by mouth Daily.    Primary insomnia  -     zolpidem (AMBIEN) 10 MG tablet; Take 1 tablet by mouth At  Night As Needed for Sleep.    Major depressive disorder, recurrent episode, in full remission (CMS/HCC)  -     FLUoxetine (PROzac) 20 MG capsule; Take 2 tablets QD    Acquired hypothyroidism  -     levothyroxine (SYNTHROID, LEVOTHROID) 25 MCG tablet; Take 1 tablet by mouth Daily.

## 2019-09-04 ENCOUNTER — OFFICE VISIT (OUTPATIENT)
Dept: FAMILY MEDICINE CLINIC | Facility: CLINIC | Age: 54
End: 2019-09-04

## 2019-09-04 VITALS
RESPIRATION RATE: 14 BRPM | DIASTOLIC BLOOD PRESSURE: 75 MMHG | WEIGHT: 173 LBS | SYSTOLIC BLOOD PRESSURE: 110 MMHG | HEIGHT: 67 IN | TEMPERATURE: 97.8 F | BODY MASS INDEX: 27.15 KG/M2 | HEART RATE: 54 BPM

## 2019-09-04 DIAGNOSIS — E03.9 ACQUIRED HYPOTHYROIDISM: ICD-10-CM

## 2019-09-04 DIAGNOSIS — E78.2 MIXED HYPERLIPIDEMIA: ICD-10-CM

## 2019-09-04 DIAGNOSIS — F43.0 ACUTE REACTION TO STRESS: ICD-10-CM

## 2019-09-04 DIAGNOSIS — Z00.00 ANNUAL PHYSICAL EXAM: ICD-10-CM

## 2019-09-04 DIAGNOSIS — F51.01 PRIMARY INSOMNIA: Primary | ICD-10-CM

## 2019-09-04 PROCEDURE — 99214 OFFICE O/P EST MOD 30 MIN: CPT | Performed by: FAMILY MEDICINE

## 2019-09-04 RX ORDER — ALPRAZOLAM 0.5 MG/1
0.5 TABLET ORAL 2 TIMES DAILY
Qty: 60 TABLET | Refills: 2 | Status: SHIPPED | OUTPATIENT
Start: 2019-09-04 | End: 2019-11-25 | Stop reason: SDUPTHER

## 2019-09-04 RX ORDER — ZOLPIDEM TARTRATE 10 MG/1
10 TABLET ORAL NIGHTLY PRN
Qty: 30 TABLET | Refills: 2 | Status: SHIPPED | OUTPATIENT
Start: 2019-09-04 | End: 2019-11-25 | Stop reason: SDUPTHER

## 2019-09-04 NOTE — PROGRESS NOTES
"Subjective   Willie Enciso is a 53 y.o. female.     History of Present Illness     Chief Complaint:   Chief Complaint   Patient presents with   • Anxiety     med refil - joão - CSA updated today    • Insomnia     requesting labs        Willie Enciso 53 y.o. female who presents today for Medical Management of the below listed issues and medication refills.  she has a problem list of   Patient Active Problem List   Diagnosis   • Hyperlipidemia   • Hypothyroidism   • Insomnia   • Major depressive disorder, recurrent episode, in full remission (CMS/HCC)   • Acute reaction to stress   .  Since the last visit, she has overall felt well.  she has been compliant with   Current Outpatient Medications:   •  ALPRAZolam (XANAX) 0.5 MG tablet, Take 1 tablet by mouth 2 (Two) Times a Day., Disp: 60 tablet, Rfl: 2  •  ANTARA 90 MG capsule, Take 90 mg by mouth Daily., Disp: 30 capsule, Rfl: 5  •  FLUoxetine (PROzac) 20 MG capsule, Take 2 tablets QD, Disp: 60 capsule, Rfl: 5  •  levothyroxine (SYNTHROID, LEVOTHROID) 25 MCG tablet, Take 1 tablet by mouth Daily., Disp: 30 tablet, Rfl: 5  •  zolpidem (AMBIEN) 10 MG tablet, Take 1 tablet by mouth At Night As Needed for Sleep., Disp: 30 tablet, Rfl: 2.  she denies medication side effects.    All of the chronic condition(s) listed above are stable w/o issues.    /75   Pulse 54   Temp 97.8 °F (36.6 °C) (Oral)   Resp 14   Ht 170.2 cm (67\")   Wt 78.5 kg (173 lb)   BMI 27.10 kg/m²     Results for orders placed or performed in visit on 03/18/19   POC Urinalysis Dipstick, Automated   Result Value Ref Range    Color Yellow Yellow, Straw, Dark Yellow, Delphine    Clarity, UA Clear Clear    Specific Gravity  1.010 1.005 - 1.030    pH, Urine 6.0 5.0 - 8.0    Leukocytes Negative Negative    Nitrite, UA Negative Negative    Protein, POC Negative Negative mg/dL    Glucose, UA Negative Negative, 1000 mg/dL (3+) mg/dL    Ketones, UA Negative Negative    Urobilinogen, UA Normal Normal    " Bilirubin Negative Negative    Blood, UA Trace (A) Negative           The following portions of the patient's history were reviewed and updated as appropriate: allergies, current medications, past family history, past medical history, past social history, past surgical history and problem list.    Review of Systems   Constitutional: Negative for activity change, chills, fatigue and fever.   Respiratory: Negative for cough and chest tightness.    Cardiovascular: Negative for chest pain and palpitations.   Gastrointestinal: Negative for abdominal pain and nausea.   Endocrine: Negative for cold intolerance.   Psychiatric/Behavioral: Negative for behavioral problems and dysphoric mood.       Objective   Physical Exam   Constitutional: She appears well-developed and well-nourished.   Neck: Neck supple. No thyromegaly present.   Cardiovascular: Normal rate and regular rhythm.   No murmur heard.  Pulmonary/Chest: Effort normal and breath sounds normal.   Abdominal: Bowel sounds are normal. There is no tenderness.   Neurological: She is alert.   Psychiatric: She has a normal mood and affect. Her behavior is normal.   Nursing note and vitals reviewed.    The patient has read and signed the [facility] Controlled Substance Contract.  I will continue to see patient for regular follow up appointments.  They are well controlled on their medication.  CHIQUI has been reviewed by me and is updated every 3 months. The patient is aware of the potential for addiction and dependence.    Assessment/Plan   Willie was seen today for anxiety and insomnia.    Diagnoses and all orders for this visit:    Primary insomnia  -     zolpidem (AMBIEN) 10 MG tablet; Take 1 tablet by mouth At Night As Needed for Sleep.    Acute reaction to stress  -     ALPRAZolam (XANAX) 0.5 MG tablet; Take 1 tablet by mouth 2 (Two) Times a Day.    Mixed hyperlipidemia  -     Lipid panel    Acquired hypothyroidism  -     TSH  -     T4, Free    Annual physical  exam  Comments:  for labs only, don't fill  Orders:  -     Comprehensive metabolic panel  -     CBC and Differential

## 2019-09-05 LAB
ALBUMIN SERPL-MCNC: 4.5 G/DL (ref 3.5–5.2)
ALBUMIN/GLOB SERPL: 2.1 G/DL
ALP SERPL-CCNC: 49 U/L (ref 39–117)
ALT SERPL-CCNC: 18 U/L (ref 1–33)
AST SERPL-CCNC: 17 U/L (ref 1–32)
BASOPHILS # BLD AUTO: 0.06 10*3/MM3 (ref 0–0.2)
BASOPHILS NFR BLD AUTO: 1 % (ref 0–1.5)
BILIRUB SERPL-MCNC: 0.2 MG/DL (ref 0.2–1.2)
BUN SERPL-MCNC: 11 MG/DL (ref 6–20)
BUN/CREAT SERPL: 13.1 (ref 7–25)
CALCIUM SERPL-MCNC: 9.6 MG/DL (ref 8.6–10.5)
CHLORIDE SERPL-SCNC: 102 MMOL/L (ref 98–107)
CHOLEST SERPL-MCNC: 176 MG/DL (ref 0–200)
CO2 SERPL-SCNC: 25.9 MMOL/L (ref 22–29)
CREAT SERPL-MCNC: 0.84 MG/DL (ref 0.57–1)
EOSINOPHIL # BLD AUTO: 0.13 10*3/MM3 (ref 0–0.4)
EOSINOPHIL NFR BLD AUTO: 2.1 % (ref 0.3–6.2)
ERYTHROCYTE [DISTWIDTH] IN BLOOD BY AUTOMATED COUNT: 13.3 % (ref 12.3–15.4)
GLOBULIN SER CALC-MCNC: 2.1 GM/DL
GLUCOSE SERPL-MCNC: 97 MG/DL (ref 65–99)
HCT VFR BLD AUTO: 44.5 % (ref 34–46.6)
HDLC SERPL-MCNC: 45 MG/DL (ref 40–60)
HGB BLD-MCNC: 13.7 G/DL (ref 12–15.9)
IMM GRANULOCYTES # BLD AUTO: 0.02 10*3/MM3 (ref 0–0.05)
IMM GRANULOCYTES NFR BLD AUTO: 0.3 % (ref 0–0.5)
LDLC SERPL CALC-MCNC: 113 MG/DL (ref 0–100)
LYMPHOCYTES # BLD AUTO: 2.01 10*3/MM3 (ref 0.7–3.1)
LYMPHOCYTES NFR BLD AUTO: 32.6 % (ref 19.6–45.3)
MCH RBC QN AUTO: 29.6 PG (ref 26.6–33)
MCHC RBC AUTO-ENTMCNC: 30.8 G/DL (ref 31.5–35.7)
MCV RBC AUTO: 96.1 FL (ref 79–97)
MONOCYTES # BLD AUTO: 0.53 10*3/MM3 (ref 0.1–0.9)
MONOCYTES NFR BLD AUTO: 8.6 % (ref 5–12)
NEUTROPHILS # BLD AUTO: 3.41 10*3/MM3 (ref 1.7–7)
NEUTROPHILS NFR BLD AUTO: 55.4 % (ref 42.7–76)
NRBC BLD AUTO-RTO: 0 /100 WBC (ref 0–0.2)
PLATELET # BLD AUTO: 281 10*3/MM3 (ref 140–450)
POTASSIUM SERPL-SCNC: 5 MMOL/L (ref 3.5–5.2)
PROT SERPL-MCNC: 6.6 G/DL (ref 6–8.5)
RBC # BLD AUTO: 4.63 10*6/MM3 (ref 3.77–5.28)
SODIUM SERPL-SCNC: 140 MMOL/L (ref 136–145)
T4 FREE SERPL-MCNC: 1.38 NG/DL (ref 0.93–1.7)
TRIGL SERPL-MCNC: 90 MG/DL (ref 0–150)
TSH SERPL DL<=0.005 MIU/L-ACNC: 5.56 UIU/ML (ref 0.27–4.2)
VLDLC SERPL CALC-MCNC: 18 MG/DL
WBC # BLD AUTO: 6.16 10*3/MM3 (ref 3.4–10.8)

## 2019-09-09 DIAGNOSIS — E03.8 OTHER SPECIFIED HYPOTHYROIDISM: Primary | ICD-10-CM

## 2019-09-09 RX ORDER — LEVOTHYROXINE SODIUM 0.05 MG/1
50 TABLET ORAL DAILY
Qty: 30 TABLET | Refills: 5 | Status: SHIPPED | OUTPATIENT
Start: 2019-09-09 | End: 2019-10-28 | Stop reason: DRUGHIGH

## 2019-10-09 ENCOUNTER — TELEPHONE (OUTPATIENT)
Dept: FAMILY MEDICINE CLINIC | Facility: CLINIC | Age: 54
End: 2019-10-09

## 2019-10-23 ENCOUNTER — RESULTS ENCOUNTER (OUTPATIENT)
Dept: FAMILY MEDICINE CLINIC | Facility: CLINIC | Age: 54
End: 2019-10-23

## 2019-10-23 DIAGNOSIS — E03.8 OTHER SPECIFIED HYPOTHYROIDISM: ICD-10-CM

## 2019-10-25 ENCOUNTER — DOCUMENTATION (OUTPATIENT)
Dept: FAMILY MEDICINE CLINIC | Facility: CLINIC | Age: 54
End: 2019-10-25

## 2019-10-26 LAB
T3FREE SERPL-MCNC: 2 PG/ML (ref 2–4.4)
T4 FREE SERPL-MCNC: 1.28 NG/DL (ref 0.93–1.7)
TSH SERPL DL<=0.005 MIU/L-ACNC: 5.07 UIU/ML (ref 0.27–4.2)

## 2019-10-28 DIAGNOSIS — E03.9 HYPOTHYROIDISM, UNSPECIFIED TYPE: Primary | ICD-10-CM

## 2019-10-28 LAB
Lab: NORMAL
T3 SERPL-MCNC: 68.1 NG/DL (ref 80–200)
WRITTEN AUTHORIZATION: NORMAL

## 2019-10-28 RX ORDER — LEVOTHYROXINE SODIUM 0.07 MG/1
75 TABLET ORAL DAILY
Qty: 30 TABLET | Refills: 5 | Status: SHIPPED | OUTPATIENT
Start: 2019-10-28 | End: 2019-11-25 | Stop reason: SDUPTHER

## 2019-11-25 ENCOUNTER — OFFICE VISIT (OUTPATIENT)
Dept: FAMILY MEDICINE CLINIC | Facility: CLINIC | Age: 54
End: 2019-11-25

## 2019-11-25 VITALS
SYSTOLIC BLOOD PRESSURE: 118 MMHG | WEIGHT: 172 LBS | OXYGEN SATURATION: 99 % | HEIGHT: 67 IN | HEART RATE: 61 BPM | BODY MASS INDEX: 27 KG/M2 | RESPIRATION RATE: 16 BRPM | TEMPERATURE: 98 F | DIASTOLIC BLOOD PRESSURE: 72 MMHG

## 2019-11-25 DIAGNOSIS — F33.42 MAJOR DEPRESSIVE DISORDER, RECURRENT EPISODE, IN FULL REMISSION (HCC): ICD-10-CM

## 2019-11-25 DIAGNOSIS — E03.9 ACQUIRED HYPOTHYROIDISM: ICD-10-CM

## 2019-11-25 DIAGNOSIS — F51.01 PRIMARY INSOMNIA: ICD-10-CM

## 2019-11-25 DIAGNOSIS — E78.2 MIXED HYPERLIPIDEMIA: ICD-10-CM

## 2019-11-25 DIAGNOSIS — F43.0 ACUTE REACTION TO STRESS: Primary | ICD-10-CM

## 2019-11-25 PROCEDURE — 99214 OFFICE O/P EST MOD 30 MIN: CPT | Performed by: FAMILY MEDICINE

## 2019-11-25 RX ORDER — ALPRAZOLAM 0.5 MG/1
0.5 TABLET ORAL 2 TIMES DAILY
Qty: 60 TABLET | Refills: 2 | Status: SHIPPED | OUTPATIENT
Start: 2019-11-25 | End: 2020-02-13 | Stop reason: SDUPTHER

## 2019-11-25 RX ORDER — LEVOTHYROXINE SODIUM 0.07 MG/1
75 TABLET ORAL DAILY
Qty: 30 TABLET | Refills: 5 | Status: SHIPPED | OUTPATIENT
Start: 2019-11-25 | End: 2019-12-16 | Stop reason: DRUGHIGH

## 2019-11-25 RX ORDER — ZOLPIDEM TARTRATE 10 MG/1
10 TABLET ORAL NIGHTLY PRN
Qty: 30 TABLET | Refills: 2 | Status: SHIPPED | OUTPATIENT
Start: 2019-11-25 | End: 2020-02-13 | Stop reason: SDUPTHER

## 2019-11-25 RX ORDER — FLUOXETINE HYDROCHLORIDE 20 MG/1
CAPSULE ORAL
Qty: 60 CAPSULE | Refills: 5 | Status: SHIPPED | OUTPATIENT
Start: 2019-11-25 | End: 2020-02-13

## 2019-11-25 RX ORDER — FENOFIBRATE 90 MG/1
90 CAPSULE ORAL DAILY
Qty: 30 CAPSULE | Refills: 5 | Status: SHIPPED | OUTPATIENT
Start: 2019-11-25 | End: 2020-04-21 | Stop reason: SDUPTHER

## 2019-11-25 NOTE — PROGRESS NOTES
"Subjective   Willie Enciso is a 54 y.o. female.     History of Present Illness     Chief Complaint:   Chief Complaint   Patient presents with   • Insomnia     pt needs med refills.    • Hypothyroidism   • Hyperlipidemia       Willie Enciso 54 y.o. female who presents today for Medical Management of the below listed issues and medication refills.  she has a problem list of   Patient Active Problem List   Diagnosis   • Hyperlipidemia   • Hypothyroidism   • Insomnia   • Major depressive disorder, recurrent episode, in full remission (CMS/HCC)   • Acute reaction to stress   .  Since the last visit, she has overall felt well.  she has been compliant with   Current Outpatient Medications:   •  ALPRAZolam (XANAX) 0.5 MG tablet, Take 1 tablet by mouth 2 (Two) Times a Day., Disp: 60 tablet, Rfl: 2  •  ANTARA 90 MG capsule, Take 90 mg by mouth Daily., Disp: 30 capsule, Rfl: 5  •  FLUoxetine (PROzac) 20 MG capsule, Take 2 tablets QD, Disp: 60 capsule, Rfl: 5  •  levothyroxine (SYNTHROID) 75 MCG tablet, Take 1 tablet by mouth Daily., Disp: 30 tablet, Rfl: 5  •  zolpidem (AMBIEN) 10 MG tablet, Take 1 tablet by mouth At Night As Needed for Sleep., Disp: 30 tablet, Rfl: 2.  she denies medication side effects.    All of the other chronic condition(s) listed above are stable w/o issues.    /72   Pulse 61   Temp 98 °F (36.7 °C) (Oral)   Resp 16   Ht 170.2 cm (67\")   Wt 78 kg (172 lb)   SpO2 99%   BMI 26.94 kg/m²     Results for orders placed or performed in visit on 10/23/19   T4, free   Result Value Ref Range    Free T4 1.28 0.93 - 1.70 ng/dL   TSH   Result Value Ref Range    TSH 5.070 (H) 0.270 - 4.200 uIU/mL   T3, free   Result Value Ref Range    T3, Free 2.0 2.0 - 4.4 pg/mL   T3   Result Value Ref Range    T3, Total 68.1 (L) 80.0 - 200.0 ng/dl   Please Note   Result Value Ref Range    Please note Comment    Written Authorization   Result Value Ref Range    Written Authorization Comment            The following " portions of the patient's history were reviewed and updated as appropriate: allergies, current medications, past family history, past medical history, past social history, past surgical history and problem list.    Review of Systems   Constitutional: Negative for activity change, chills, fatigue and fever.   Respiratory: Negative for cough and chest tightness.    Cardiovascular: Negative for chest pain and palpitations.   Gastrointestinal: Negative for abdominal pain and nausea.   Endocrine: Negative for cold intolerance.   Psychiatric/Behavioral: Negative for behavioral problems and dysphoric mood.       Objective   Physical Exam   Constitutional: She appears well-developed and well-nourished.   Neck: Neck supple. No thyromegaly present.   Cardiovascular: Normal rate and regular rhythm.   No murmur heard.  Pulmonary/Chest: Effort normal and breath sounds normal.   Abdominal: Bowel sounds are normal. There is no tenderness.   Neurological: She is alert.   Psychiatric: She has a normal mood and affect. Her behavior is normal.   Nursing note and vitals reviewed.    The patient has read and signed the [facility] Controlled Substance Contract.  I will continue to see patient for regular follow up appointments.  They are well controlled on their medication.  CHIQUI has been reviewed by me and is updated every 3 months. The patient is aware of the potential for addiction and dependence.    Assessment/Plan   Willie was seen today for insomnia, hypothyroidism and hyperlipidemia.    Diagnoses and all orders for this visit:    Acute reaction to stress  -     ALPRAZolam (XANAX) 0.5 MG tablet; Take 1 tablet by mouth 2 (Two) Times a Day.    Mixed hyperlipidemia  -     ANTARA 90 MG capsule; Take 90 mg by mouth Daily.    Major depressive disorder, recurrent episode, in full remission (CMS/[facility])  -     FLUoxetine (PROzac) 20 MG capsule; Take 2 tablets QD    Primary insomnia  -     zolpidem (AMBIEN) 10 MG tablet; Take 1 tablet by  mouth At Night As Needed for Sleep.    Acquired hypothyroidism  -     levothyroxine (SYNTHROID) 75 MCG tablet; Take 1 tablet by mouth Daily.  -     TSH  -     T4, Free  -     T3

## 2019-11-28 ENCOUNTER — RESULTS ENCOUNTER (OUTPATIENT)
Dept: FAMILY MEDICINE CLINIC | Facility: CLINIC | Age: 54
End: 2019-11-28

## 2019-11-28 DIAGNOSIS — E03.9 HYPOTHYROIDISM, UNSPECIFIED TYPE: ICD-10-CM

## 2019-12-14 LAB
T3 SERPL-MCNC: 72.8 NG/DL (ref 80–200)
T4 FREE SERPL-MCNC: 1.73 NG/DL (ref 0.93–1.7)
TSH SERPL DL<=0.005 MIU/L-ACNC: 3.93 UIU/ML (ref 0.27–4.2)

## 2019-12-16 DIAGNOSIS — E03.9 HYPOTHYROIDISM, UNSPECIFIED TYPE: Primary | ICD-10-CM

## 2019-12-16 RX ORDER — LEVOTHYROXINE SODIUM 0.05 MG/1
50 TABLET ORAL DAILY
Qty: 30 TABLET | Refills: 5 | Status: SHIPPED | OUTPATIENT
Start: 2019-12-16 | End: 2020-04-21 | Stop reason: SDUPTHER

## 2019-12-16 RX ORDER — LIOTHYRONINE SODIUM 5 UG/1
5 TABLET ORAL DAILY
Qty: 30 TABLET | Refills: 5 | Status: SHIPPED | OUTPATIENT
Start: 2019-12-16 | End: 2020-04-21 | Stop reason: SDUPTHER

## 2020-02-08 LAB
T3 SERPL-MCNC: 84.9 NG/DL (ref 80–200)
T4 FREE SERPL-MCNC: 1.28 NG/DL (ref 0.93–1.7)
TSH SERPL DL<=0.005 MIU/L-ACNC: 3.82 UIU/ML (ref 0.27–4.2)

## 2020-02-13 ENCOUNTER — OFFICE VISIT (OUTPATIENT)
Dept: FAMILY MEDICINE CLINIC | Facility: CLINIC | Age: 55
End: 2020-02-13

## 2020-02-13 VITALS
TEMPERATURE: 98 F | HEIGHT: 67 IN | BODY MASS INDEX: 26.68 KG/M2 | DIASTOLIC BLOOD PRESSURE: 72 MMHG | SYSTOLIC BLOOD PRESSURE: 122 MMHG | HEART RATE: 66 BPM | RESPIRATION RATE: 14 BRPM | WEIGHT: 170 LBS

## 2020-02-13 DIAGNOSIS — E03.9 ACQUIRED HYPOTHYROIDISM: Primary | ICD-10-CM

## 2020-02-13 DIAGNOSIS — F43.0 ACUTE REACTION TO STRESS: ICD-10-CM

## 2020-02-13 DIAGNOSIS — F51.01 PRIMARY INSOMNIA: ICD-10-CM

## 2020-02-13 DIAGNOSIS — F33.42 MAJOR DEPRESSIVE DISORDER, RECURRENT EPISODE, IN FULL REMISSION (HCC): ICD-10-CM

## 2020-02-13 PROCEDURE — 99213 OFFICE O/P EST LOW 20 MIN: CPT | Performed by: FAMILY MEDICINE

## 2020-02-13 RX ORDER — FLUOXETINE HYDROCHLORIDE 20 MG/1
60 CAPSULE ORAL DAILY
Qty: 90 CAPSULE | Refills: 5 | Status: SHIPPED | OUTPATIENT
Start: 2020-02-13 | End: 2020-07-20 | Stop reason: SDUPTHER

## 2020-02-13 RX ORDER — ZOLPIDEM TARTRATE 10 MG/1
10 TABLET ORAL NIGHTLY PRN
Qty: 30 TABLET | Refills: 2 | Status: SHIPPED | OUTPATIENT
Start: 2020-02-13 | End: 2020-04-21 | Stop reason: SDUPTHER

## 2020-02-13 RX ORDER — ALPRAZOLAM 0.5 MG/1
0.5 TABLET ORAL 2 TIMES DAILY
Qty: 60 TABLET | Refills: 2 | Status: SHIPPED | OUTPATIENT
Start: 2020-02-13 | End: 2020-04-21 | Stop reason: SDUPTHER

## 2020-02-13 NOTE — PROGRESS NOTES
"Subjective   Willie Enciso is a 54 y.o. female.     History of Present Illness     Chief Complaint:   Chief Complaint   Patient presents with   • Stress     med refill joão  - lab results     • Insomnia       Willie Enciso 54 y.o. female who presents today for Medical Management of the below listed issues and medication refills.  she has a problem list of   Patient Active Problem List   Diagnosis   • Hyperlipidemia   • Hypothyroidism   • Insomnia   • Major depressive disorder, recurrent episode, in full remission (CMS/HCC)   • Acute reaction to stress   .  Since the last visit, she has overall felt well, although has had increased panic attacks and anxiety issues recently.  she has been compliant with   Current Outpatient Medications:   •  ALPRAZolam (XANAX) 0.5 MG tablet, Take 1 tablet by mouth 2 (Two) Times a Day., Disp: 60 tablet, Rfl: 2  •  ANTARA 90 MG capsule, Take 90 mg by mouth Daily., Disp: 30 capsule, Rfl: 5  •  FLUoxetine (PROzac) 20 MG capsule, Take 3 capsules by mouth Daily. Take 2 tablets QD, Disp: 90 capsule, Rfl: 5  •  levothyroxine (SYNTHROID) 50 MCG tablet, Take 1 tablet by mouth Daily., Disp: 30 tablet, Rfl: 5  •  liothyronine (CYTOMEL) 5 MCG tablet, Take 1 tablet by mouth Daily., Disp: 30 tablet, Rfl: 5  •  zolpidem (AMBIEN) 10 MG tablet, Take 1 tablet by mouth At Night As Needed for Sleep., Disp: 30 tablet, Rfl: 2.  she denies medication side effects.    All of the other chronic condition(s) listed above are stable w/o issues.    /72   Pulse 66   Temp 98 °F (36.7 °C) (Oral)   Resp 14   Ht 170.2 cm (67\")   Wt 77.1 kg (170 lb)   BMI 26.63 kg/m²     Results for orders placed or performed in visit on 12/16/19   TSH   Result Value Ref Range    TSH 3.820 0.270 - 4.200 uIU/mL   T4, free   Result Value Ref Range    Free T4 1.28 0.93 - 1.70 ng/dL   T3   Result Value Ref Range    T3, Total 84.9 80.0 - 200.0 ng/dl           The following portions of the patient's history were reviewed and " updated as appropriate: allergies, current medications, past family history, past medical history, past social history, past surgical history and problem list.    Review of Systems   Constitutional: Negative for activity change, chills, fatigue and fever.   Respiratory: Negative for cough and chest tightness.    Cardiovascular: Negative for chest pain and palpitations.   Gastrointestinal: Negative for abdominal pain and nausea.   Endocrine: Negative for cold intolerance.   Psychiatric/Behavioral: Negative for behavioral problems and dysphoric mood.       Objective   Physical Exam   Constitutional: She appears well-developed and well-nourished.   Neck: Neck supple. No thyromegaly present.   Cardiovascular: Normal rate and regular rhythm.   No murmur heard.  Pulmonary/Chest: Effort normal and breath sounds normal.   Abdominal: Bowel sounds are normal. There is no tenderness.   Neurological: She is alert.   Psychiatric: She has a normal mood and affect. Her behavior is normal.   Nursing note and vitals reviewed.  Labs reviewed with pt today during visit. All questions answered.    The patient has read and signed the [facility] Controlled Substance Contract.  I will continue to see patient for regular follow up appointments.  They are well controlled on their medication.  CHIQUI has been reviewed by me and is updated every 3 months. The patient is aware of the potential for addiction and dependence.      Assessment/Plan   Willie was seen today for stress and insomnia.    Diagnoses and all orders for this visit:    Acquired hypothyroidism    Acute reaction to stress  -     ALPRAZolam (XANAX) 0.5 MG tablet; Take 1 tablet by mouth 2 (Two) Times a Day.    Primary insomnia  -     zolpidem (AMBIEN) 10 MG tablet; Take 1 tablet by mouth At Night As Needed for Sleep.    Major depressive disorder, recurrent episode, in full remission (CMS/[facility])  -     FLUoxetine (PROzac) 20 MG capsule; Take 3 capsules by mouth Daily. Take 2  tablets QD    Pt to continue with current doses of thyroid medications.

## 2020-04-14 DIAGNOSIS — F43.0 ACUTE REACTION TO STRESS: ICD-10-CM

## 2020-04-14 DIAGNOSIS — F51.01 PRIMARY INSOMNIA: ICD-10-CM

## 2020-04-14 RX ORDER — ZOLPIDEM TARTRATE 10 MG/1
10 TABLET ORAL NIGHTLY PRN
Qty: 30 TABLET | Refills: 2 | OUTPATIENT
Start: 2020-04-14

## 2020-04-14 RX ORDER — ALPRAZOLAM 0.5 MG/1
0.5 TABLET ORAL 2 TIMES DAILY
Qty: 60 TABLET | Refills: 2 | OUTPATIENT
Start: 2020-04-14

## 2020-04-21 ENCOUNTER — TELEMEDICINE (OUTPATIENT)
Dept: FAMILY MEDICINE CLINIC | Facility: CLINIC | Age: 55
End: 2020-04-21

## 2020-04-21 DIAGNOSIS — F51.01 PRIMARY INSOMNIA: ICD-10-CM

## 2020-04-21 DIAGNOSIS — E03.9 ACQUIRED HYPOTHYROIDISM: ICD-10-CM

## 2020-04-21 DIAGNOSIS — F43.0 ACUTE REACTION TO STRESS: ICD-10-CM

## 2020-04-21 DIAGNOSIS — E78.2 MIXED HYPERLIPIDEMIA: ICD-10-CM

## 2020-04-21 PROCEDURE — 99214 OFFICE O/P EST MOD 30 MIN: CPT | Performed by: FAMILY MEDICINE

## 2020-04-21 RX ORDER — FENOFIBRATE 90 MG/1
90 CAPSULE ORAL DAILY
Qty: 30 CAPSULE | Refills: 5 | Status: SHIPPED | OUTPATIENT
Start: 2020-04-21 | End: 2020-10-11 | Stop reason: SDUPTHER

## 2020-04-21 RX ORDER — LIOTHYRONINE SODIUM 5 UG/1
5 TABLET ORAL DAILY
Qty: 30 TABLET | Refills: 5 | Status: SHIPPED | OUTPATIENT
Start: 2020-04-21 | End: 2020-10-11 | Stop reason: SDUPTHER

## 2020-04-21 RX ORDER — LEVOTHYROXINE SODIUM 0.05 MG/1
50 TABLET ORAL DAILY
Qty: 30 TABLET | Refills: 5 | Status: SHIPPED | OUTPATIENT
Start: 2020-04-21 | End: 2020-10-11 | Stop reason: SDUPTHER

## 2020-04-21 RX ORDER — ZOLPIDEM TARTRATE 10 MG/1
10 TABLET ORAL NIGHTLY PRN
Qty: 30 TABLET | Refills: 2 | Status: SHIPPED | OUTPATIENT
Start: 2020-04-21 | End: 2020-07-20 | Stop reason: SDUPTHER

## 2020-04-21 RX ORDER — ALPRAZOLAM 0.5 MG/1
0.5 TABLET ORAL 2 TIMES DAILY
Qty: 60 TABLET | Refills: 2 | Status: SHIPPED | OUTPATIENT
Start: 2020-04-21 | End: 2020-07-20 | Stop reason: SDUPTHER

## 2020-04-21 NOTE — PROGRESS NOTES
Subjective   Willie Enciso is a 54 y.o. female.     CC: Video Visit for Medical Management    History of Present Illness     Chief Complaint:   Chief Complaint   Patient presents with   • Anxiety   • Hyperlipidemia   • Hypothyroidism   • Insomnia       Willie Enciso 54 y.o. female who presents today for Medical Management of the below listed issues and medication refills.  she has a problem list of   Patient Active Problem List   Diagnosis   • Hyperlipidemia   • Hypothyroidism   • Insomnia   • Major depressive disorder, recurrent episode, in full remission (CMS/HCC)   • Acute reaction to stress   .  Since the last visit, she has overall felt well.  she has been compliant with   Current Outpatient Medications:   •  ALPRAZolam (XANAX) 0.5 MG tablet, Take 1 tablet by mouth 2 (Two) Times a Day., Disp: 60 tablet, Rfl: 2  •  ANTARA 90 MG capsule, Take 90 mg by mouth Daily., Disp: 30 capsule, Rfl: 5  •  FLUoxetine (PROzac) 20 MG capsule, Take 3 capsules by mouth Daily. Take 2 tablets QD, Disp: 90 capsule, Rfl: 5  •  levothyroxine (Synthroid) 50 MCG tablet, Take 1 tablet by mouth Daily., Disp: 30 tablet, Rfl: 5  •  liothyronine (Cytomel) 5 MCG tablet, Take 1 tablet by mouth Daily., Disp: 30 tablet, Rfl: 5  •  zolpidem (AMBIEN) 10 MG tablet, Take 1 tablet by mouth At Night As Needed for Sleep., Disp: 30 tablet, Rfl: 2.  she denies medication side effects.    All of the other chronic condition(s) listed above are stable w/o issues.    There were no vitals taken for this visit.    Results for orders placed or performed in visit on 12/16/19   TSH   Result Value Ref Range    TSH 3.820 0.270 - 4.200 uIU/mL   T4, free   Result Value Ref Range    Free T4 1.28 0.93 - 1.70 ng/dL   T3   Result Value Ref Range    T3, Total 84.9 80.0 - 200.0 ng/dl           The following portions of the patient's history were reviewed and updated as appropriate: allergies, current medications, past family history, past medical history, past social  history, past surgical history and problem list.    Review of Systems   Constitutional: Negative for activity change, chills and fever.   Respiratory: Negative for cough.    Cardiovascular: Negative for chest pain.   Psychiatric/Behavioral: Negative for dysphoric mood.       Objective   Physical Exam   Constitutional: She appears well-developed and well-nourished. No distress.   Psychiatric: She has a normal mood and affect. Her behavior is normal. Thought content normal.       Assessment/Plan   Willie was seen today for anxiety, hyperlipidemia, hypothyroidism and insomnia.    Diagnoses and all orders for this visit:    Acute reaction to stress  -     ALPRAZolam (XANAX) 0.5 MG tablet; Take 1 tablet by mouth 2 (Two) Times a Day.    Mixed hyperlipidemia  -     ANTARA 90 MG capsule; Take 90 mg by mouth Daily.    Acquired hypothyroidism  -     levothyroxine (Synthroid) 50 MCG tablet; Take 1 tablet by mouth Daily.  -     liothyronine (Cytomel) 5 MCG tablet; Take 1 tablet by mouth Daily.    Primary insomnia  -     zolpidem (AMBIEN) 10 MG tablet; Take 1 tablet by mouth At Night As Needed for Sleep.    Spent 17    minutes with chart and interview and consent for this encounter given by the patient.  You have chosen to receive care through a telehealth visit.  Do you consent to use a video/audio connection for your medical care today? Yes

## 2020-07-21 ENCOUNTER — TELEMEDICINE (OUTPATIENT)
Dept: FAMILY MEDICINE CLINIC | Facility: CLINIC | Age: 55
End: 2020-07-21

## 2020-07-21 DIAGNOSIS — F51.01 PRIMARY INSOMNIA: ICD-10-CM

## 2020-07-21 DIAGNOSIS — Z20.9 EXPOSURE TO COMMUNICABLE DISEASE: ICD-10-CM

## 2020-07-21 DIAGNOSIS — F33.42 MAJOR DEPRESSIVE DISORDER, RECURRENT EPISODE, IN FULL REMISSION (HCC): ICD-10-CM

## 2020-07-21 DIAGNOSIS — E78.2 MIXED HYPERLIPIDEMIA: ICD-10-CM

## 2020-07-21 DIAGNOSIS — Z00.00 ANNUAL PHYSICAL EXAM: ICD-10-CM

## 2020-07-21 DIAGNOSIS — E03.9 ACQUIRED HYPOTHYROIDISM: ICD-10-CM

## 2020-07-21 DIAGNOSIS — F43.0 ACUTE REACTION TO STRESS: Primary | ICD-10-CM

## 2020-07-21 PROCEDURE — 99214 OFFICE O/P EST MOD 30 MIN: CPT | Performed by: FAMILY MEDICINE

## 2020-07-21 RX ORDER — ZOLPIDEM TARTRATE 10 MG/1
10 TABLET ORAL NIGHTLY PRN
Qty: 30 TABLET | Refills: 2 | Status: SHIPPED | OUTPATIENT
Start: 2020-07-21 | End: 2020-10-11 | Stop reason: SDUPTHER

## 2020-07-21 RX ORDER — ALPRAZOLAM 0.5 MG/1
0.5 TABLET ORAL 2 TIMES DAILY
Qty: 60 TABLET | Refills: 2 | Status: SHIPPED | OUTPATIENT
Start: 2020-07-21 | End: 2020-10-11 | Stop reason: SDUPTHER

## 2020-07-21 RX ORDER — FLUOXETINE HYDROCHLORIDE 20 MG/1
60 CAPSULE ORAL DAILY
Qty: 90 CAPSULE | Refills: 5 | Status: SHIPPED | OUTPATIENT
Start: 2020-07-21 | End: 2020-10-11 | Stop reason: SDUPTHER

## 2020-07-21 NOTE — PROGRESS NOTES
Subjective   Willie Enciso is a 54 y.o. female.     CC: Video Visit for Medical Management    History of Present Illness     Chief Complaint:   Chief Complaint   Patient presents with   • Anxiety     med refill   -  joão - meds reviewed with pt today    • Depression     DOX VISIT    • Insomnia       Willie Enciso 54 y.o. female who presents today for Medical Management of the below listed issues and medication refills.  she has a problem list of   Patient Active Problem List   Diagnosis   • Hyperlipidemia   • Hypothyroidism   • Insomnia   • Major depressive disorder, recurrent episode, in full remission (CMS/HCC)   • Acute reaction to stress   .  Since the last visit, she has overall felt well.  she has been compliant with   Current Outpatient Medications:   •  ALPRAZolam (XANAX) 0.5 MG tablet, Take 1 tablet by mouth 2 (Two) Times a Day., Disp: 60 tablet, Rfl: 2  •  FLUoxetine (PROzac) 20 MG capsule, Take 3 capsules by mouth Daily., Disp: 90 capsule, Rfl: 5  •  zolpidem (AMBIEN) 10 MG tablet, Take 1 tablet by mouth At Night As Needed for Sleep., Disp: 30 tablet, Rfl: 2  •  ANTARA 90 MG capsule, Take 90 mg by mouth Daily., Disp: 30 capsule, Rfl: 5  •  levothyroxine (Synthroid) 50 MCG tablet, Take 1 tablet by mouth Daily., Disp: 30 tablet, Rfl: 5  •  liothyronine (Cytomel) 5 MCG tablet, Take 1 tablet by mouth Daily., Disp: 30 tablet, Rfl: 5.  she denies medication side effects.    All of the other chronic condition(s) listed above are stable w/o issues.    There were no vitals taken for this visit.    Results for orders placed or performed in visit on 12/16/19   TSH   Result Value Ref Range    TSH 3.820 0.270 - 4.200 uIU/mL   T4, free   Result Value Ref Range    Free T4 1.28 0.93 - 1.70 ng/dL   T3   Result Value Ref Range    T3, Total 84.9 80.0 - 200.0 ng/dl           The following portions of the patient's history were reviewed and updated as appropriate: allergies, current medications, past family history, past  medical history, past social history, past surgical history and problem list.    Review of Systems   Constitutional: Negative for activity change, chills and fever.   Respiratory: Negative for cough.    Cardiovascular: Negative for chest pain.   Psychiatric/Behavioral: Negative for dysphoric mood.       Objective   Physical Exam   Constitutional: She is oriented to person, place, and time. She appears well-developed and well-nourished. No distress.   Pulmonary/Chest: Effort normal.   Neurological: She is alert and oriented to person, place, and time.   Psychiatric: She has a normal mood and affect. Her behavior is normal. Thought content normal.       Assessment/Plan   Willie was seen today for anxiety, depression and insomnia.    Diagnoses and all orders for this visit:    Acute reaction to stress  -     ALPRAZolam (XANAX) 0.5 MG tablet; Take 1 tablet by mouth 2 (Two) Times a Day.    Primary insomnia  -     zolpidem (AMBIEN) 10 MG tablet; Take 1 tablet by mouth At Night As Needed for Sleep.    Major depressive disorder, recurrent episode, in full remission (CMS/[facility])  -     FLUoxetine (PROzac) 20 MG capsule; Take 3 capsules by mouth Daily.    Acquired hypothyroidism  -     TSH  -     T3, Free  -     T4, Free    Mixed hyperlipidemia  -     Lipid panel    Annual physical exam  Comments:  for labs only, don't bill  Orders:  -     Comprehensive metabolic panel  -     CBC and Differential    Exposure to communicable disease  -     SARS-CoV-2 Antibodies ([facility])    Spent  16   minutes with chart and interview and consent for this encounter given by the patient.  You have chosen to receive care through a telehealth visit.  Do you consent to use a video/audio connection for your medical care today? Yes

## 2020-09-25 LAB
ALBUMIN SERPL-MCNC: 4.3 G/DL (ref 3.5–5.2)
ALBUMIN/GLOB SERPL: 2.9 G/DL
ALP SERPL-CCNC: 74 U/L (ref 39–117)
ALT SERPL-CCNC: 13 U/L (ref 1–33)
AST SERPL-CCNC: 15 U/L (ref 1–32)
BASOPHILS # BLD AUTO: 0.07 10*3/MM3 (ref 0–0.2)
BASOPHILS NFR BLD AUTO: 1.1 % (ref 0–1.5)
BILIRUB SERPL-MCNC: 0.2 MG/DL (ref 0–1.2)
BUN SERPL-MCNC: 10 MG/DL (ref 6–20)
BUN/CREAT SERPL: 15.4 (ref 7–25)
CALCIUM SERPL-MCNC: 9.2 MG/DL (ref 8.6–10.5)
CHLORIDE SERPL-SCNC: 102 MMOL/L (ref 98–107)
CHOLEST SERPL-MCNC: 189 MG/DL (ref 0–200)
CO2 SERPL-SCNC: 27.1 MMOL/L (ref 22–29)
CREAT SERPL-MCNC: 0.65 MG/DL (ref 0.57–1)
EOSINOPHIL # BLD AUTO: 0.16 10*3/MM3 (ref 0–0.4)
EOSINOPHIL NFR BLD AUTO: 2.5 % (ref 0.3–6.2)
ERYTHROCYTE [DISTWIDTH] IN BLOOD BY AUTOMATED COUNT: 12.6 % (ref 12.3–15.4)
GLOBULIN SER CALC-MCNC: 1.5 GM/DL
GLUCOSE SERPL-MCNC: 100 MG/DL (ref 65–99)
HCT VFR BLD AUTO: 40.7 % (ref 34–46.6)
HDLC SERPL-MCNC: 53 MG/DL (ref 40–60)
HGB BLD-MCNC: 14.3 G/DL (ref 12–15.9)
IMM GRANULOCYTES # BLD AUTO: 0.02 10*3/MM3 (ref 0–0.05)
IMM GRANULOCYTES NFR BLD AUTO: 0.3 % (ref 0–0.5)
LDLC SERPL CALC-MCNC: 122 MG/DL (ref 0–100)
LYMPHOCYTES # BLD AUTO: 1.78 10*3/MM3 (ref 0.7–3.1)
LYMPHOCYTES NFR BLD AUTO: 28 % (ref 19.6–45.3)
MCH RBC QN AUTO: 31.2 PG (ref 26.6–33)
MCHC RBC AUTO-ENTMCNC: 35.1 G/DL (ref 31.5–35.7)
MCV RBC AUTO: 88.9 FL (ref 79–97)
MONOCYTES # BLD AUTO: 0.65 10*3/MM3 (ref 0.1–0.9)
MONOCYTES NFR BLD AUTO: 10.2 % (ref 5–12)
NEUTROPHILS # BLD AUTO: 3.68 10*3/MM3 (ref 1.7–7)
NEUTROPHILS NFR BLD AUTO: 57.9 % (ref 42.7–76)
NRBC BLD AUTO-RTO: 0 /100 WBC (ref 0–0.2)
PLATELET # BLD AUTO: 293 10*3/MM3 (ref 140–450)
POTASSIUM SERPL-SCNC: 5.1 MMOL/L (ref 3.5–5.2)
PROT SERPL-MCNC: 5.8 G/DL (ref 6–8.5)
RBC # BLD AUTO: 4.58 10*6/MM3 (ref 3.77–5.28)
SARS-COV-2 AB SERPL QL IA: NEGATIVE
SODIUM SERPL-SCNC: 139 MMOL/L (ref 136–145)
T3FREE SERPL-MCNC: 1.9 PG/ML (ref 2–4.4)
T4 FREE SERPL-MCNC: 1.32 NG/DL (ref 0.93–1.7)
TRIGL SERPL-MCNC: 69 MG/DL (ref 0–150)
TSH SERPL DL<=0.005 MIU/L-ACNC: 2.13 UIU/ML (ref 0.27–4.2)
VLDLC SERPL CALC-MCNC: 13.8 MG/DL
WBC # BLD AUTO: 6.36 10*3/MM3 (ref 3.4–10.8)

## 2020-10-11 NOTE — PROGRESS NOTES
"Subjective   Willie Enciso is a 55 y.o. female.     History of Present Illness     Chief Complaint:   Chief Complaint   Patient presents with   • Hypothyroidism   • Anxiety   • Depression   • Flu Vaccine     0.5ml left deltoid today        Willie Enciso 55 y.o. female who presents today for Medical Management of the below listed issues and medication refills.  she has a problem list of   Patient Active Problem List   Diagnosis   • Hyperlipidemia   • Hypothyroidism   • Insomnia   • Major depressive disorder, recurrent episode, in full remission (CMS/HCC)   • Acute reaction to stress   .  Since the last visit, she has overall felt well.  she has been compliant with   Current Outpatient Medications:   •  ALPRAZolam (XANAX) 0.5 MG tablet, Take 1 tablet by mouth 2 (Two) Times a Day., Disp: 60 tablet, Rfl: 2  •  Antara 90 MG capsule, Take 90 mg by mouth Daily., Disp: 30 capsule, Rfl: 5  •  FLUoxetine (PROzac) 20 MG capsule, Take 3 capsules by mouth Daily., Disp: 90 capsule, Rfl: 5  •  levothyroxine (Synthroid) 50 MCG tablet, Take 1 tablet by mouth Daily., Disp: 30 tablet, Rfl: 5  •  liothyronine (Cytomel) 5 MCG tablet, Take 1 tablet by mouth Daily., Disp: 30 tablet, Rfl: 5  •  zolpidem (AMBIEN) 10 MG tablet, Take 1 tablet by mouth At Night As Needed for Sleep., Disp: 30 tablet, Rfl: 2.  she denies medication side effects.    All of the other chronic condition(s) listed above are stable w/o issues.    /85   Pulse 62   Temp 97.7 °F (36.5 °C) (Oral)   Resp 16   Ht 170.2 cm (67\")   Wt 72.6 kg (160 lb)   BMI 25.06 kg/m²     Results for orders placed or performed in visit on 07/21/20   Comprehensive metabolic panel    Specimen: Blood   Result Value Ref Range    Glucose 100 (H) 65 - 99 mg/dL    BUN 10 6 - 20 mg/dL    Creatinine 0.65 0.57 - 1.00 mg/dL    eGFR Non African Am 95 >60 mL/min/1.73    eGFR African Am 115 >60 mL/min/1.73    BUN/Creatinine Ratio 15.4 7.0 - 25.0    Sodium 139 136 - 145 mmol/L    Potassium 5.1 " 3.5 - 5.2 mmol/L    Chloride 102 98 - 107 mmol/L    Total CO2 27.1 22.0 - 29.0 mmol/L    Calcium 9.2 8.6 - 10.5 mg/dL    Total Protein 5.8 (L) 6.0 - 8.5 g/dL    Albumin 4.30 3.50 - 5.20 g/dL    Globulin 1.5 gm/dL    A/G Ratio 2.9 g/dL    Total Bilirubin 0.2 0.0 - 1.2 mg/dL    Alkaline Phosphatase 74 39 - 117 U/L    AST (SGOT) 15 1 - 32 U/L    ALT (SGPT) 13 1 - 33 U/L   Lipid panel    Specimen: Blood   Result Value Ref Range    Total Cholesterol 189 0 - 200 mg/dL    Triglycerides 69 0 - 150 mg/dL    HDL Cholesterol 53 40 - 60 mg/dL    VLDL Cholesterol Herman 13.8 mg/dL    LDL Chol Calc (NIH) 122 (H) 0 - 100 mg/dL   TSH    Specimen: Blood   Result Value Ref Range    TSH 2.130 0.270 - 4.200 uIU/mL   T3, Free    Specimen: Blood   Result Value Ref Range    T3, Free 1.9 (L) 2.0 - 4.4 pg/mL   T4, Free    Specimen: Blood   Result Value Ref Range    Free T4 1.32 0.93 - 1.70 ng/dL   SARS-CoV-2 Antibodies (Roche)    Specimen: Blood   Result Value Ref Range    SARS-COV-2 ANTIBODIES Negative Negative   CBC and Differential    Specimen: Blood   Result Value Ref Range    WBC 6.36 3.40 - 10.80 10*3/mm3    RBC 4.58 3.77 - 5.28 10*6/mm3    Hemoglobin 14.3 12.0 - 15.9 g/dL    Hematocrit 40.7 34.0 - 46.6 %    MCV 88.9 79.0 - 97.0 fL    MCH 31.2 26.6 - 33.0 pg    MCHC 35.1 31.5 - 35.7 g/dL    RDW 12.6 12.3 - 15.4 %    Platelets 293 140 - 450 10*3/mm3    Neutrophil Rel % 57.9 42.7 - 76.0 %    Lymphocyte Rel % 28.0 19.6 - 45.3 %    Monocyte Rel % 10.2 5.0 - 12.0 %    Eosinophil Rel % 2.5 0.3 - 6.2 %    Basophil Rel % 1.1 0.0 - 1.5 %    Neutrophils Absolute 3.68 1.70 - 7.00 10*3/mm3    Lymphocytes Absolute 1.78 0.70 - 3.10 10*3/mm3    Monocytes Absolute 0.65 0.10 - 0.90 10*3/mm3    Eosinophils Absolute 0.16 0.00 - 0.40 10*3/mm3    Basophils Absolute 0.07 0.00 - 0.20 10*3/mm3    Immature Granulocyte Rel % 0.3 0.0 - 0.5 %    Immature Grans Absolute 0.02 0.00 - 0.05 10*3/mm3    nRBC 0.0 0.0 - 0.2 /100 WBC           The following portions of the  patient's history were reviewed and updated as appropriate: allergies, current medications, past family history, past medical history, past social history, past surgical history and problem list.    Review of Systems   Constitutional: Negative for activity change, chills, fatigue and fever.   Respiratory: Negative for cough and chest tightness.    Cardiovascular: Negative for chest pain and palpitations.   Gastrointestinal: Negative for abdominal pain and nausea.   Endocrine: Negative for cold intolerance.   Psychiatric/Behavioral: Negative for behavioral problems and dysphoric mood.       Objective   Physical Exam  Constitutional:       General: She is not in acute distress.     Appearance: She is well-developed.   Cardiovascular:      Rate and Rhythm: Normal rate and regular rhythm.   Pulmonary:      Effort: Pulmonary effort is normal.      Breath sounds: Normal breath sounds.   Neurological:      Mental Status: She is alert and oriented to person, place, and time.   Psychiatric:         Behavior: Behavior normal.         Thought Content: Thought content normal.     Labs reviewed with pt today during visit. All questions answered.    The patient has read and signed the [facility] Controlled Substance Contract.  I will continue to see patient for regular follow up appointments.  They are well controlled on their medication.  CHIQUI has been reviewed by me and is updated every 3 months. The patient is aware of the potential for addiction and dependence.      Assessment/Plan   Willie was seen today for hypothyroidism, anxiety, depression and flu vaccine.    Diagnoses and all orders for this visit:    Acquired hypothyroidism  -     liothyronine (Cytomel) 5 MCG tablet; Take 1 tablet by mouth Daily.  -     levothyroxine (Synthroid) 50 MCG tablet; Take 1 tablet by mouth Daily.    Major depressive disorder, recurrent episode, in full remission (CMS/HCC)  -     FLUoxetine (PROzac) 20 MG capsule; Take 3 capsules by mouth  Daily.    Mixed hyperlipidemia  -     Antara 90 MG capsule; Take 90 mg by mouth Daily.    Acute reaction to stress  -     ALPRAZolam (XANAX) 0.5 MG tablet; Take 1 tablet by mouth 2 (Two) Times a Day.    Primary insomnia  -     zolpidem (AMBIEN) 10 MG tablet; Take 1 tablet by mouth At Night As Needed for Sleep.    Immunization due  -     Fluarix/Fluzone/Afluria Quad>6 Months

## 2020-10-12 ENCOUNTER — OFFICE VISIT (OUTPATIENT)
Dept: FAMILY MEDICINE CLINIC | Facility: CLINIC | Age: 55
End: 2020-10-12

## 2020-10-12 VITALS
RESPIRATION RATE: 16 BRPM | SYSTOLIC BLOOD PRESSURE: 140 MMHG | BODY MASS INDEX: 25.11 KG/M2 | DIASTOLIC BLOOD PRESSURE: 85 MMHG | TEMPERATURE: 97.7 F | WEIGHT: 160 LBS | HEART RATE: 62 BPM | HEIGHT: 67 IN

## 2020-10-12 DIAGNOSIS — E78.2 MIXED HYPERLIPIDEMIA: ICD-10-CM

## 2020-10-12 DIAGNOSIS — E03.9 ACQUIRED HYPOTHYROIDISM: Primary | ICD-10-CM

## 2020-10-12 DIAGNOSIS — Z23 IMMUNIZATION DUE: ICD-10-CM

## 2020-10-12 DIAGNOSIS — F51.01 PRIMARY INSOMNIA: ICD-10-CM

## 2020-10-12 DIAGNOSIS — F33.42 MAJOR DEPRESSIVE DISORDER, RECURRENT EPISODE, IN FULL REMISSION (HCC): ICD-10-CM

## 2020-10-12 DIAGNOSIS — F43.0 ACUTE REACTION TO STRESS: ICD-10-CM

## 2020-10-12 PROCEDURE — 99214 OFFICE O/P EST MOD 30 MIN: CPT | Performed by: FAMILY MEDICINE

## 2020-10-12 PROCEDURE — 90686 IIV4 VACC NO PRSV 0.5 ML IM: CPT | Performed by: FAMILY MEDICINE

## 2020-10-12 PROCEDURE — 90471 IMMUNIZATION ADMIN: CPT | Performed by: FAMILY MEDICINE

## 2020-10-12 RX ORDER — ALPRAZOLAM 0.5 MG/1
0.5 TABLET ORAL 2 TIMES DAILY
Qty: 60 TABLET | Refills: 2 | Status: SHIPPED | OUTPATIENT
Start: 2020-10-12 | End: 2021-01-04 | Stop reason: SDUPTHER

## 2020-10-12 RX ORDER — LIOTHYRONINE SODIUM 5 UG/1
5 TABLET ORAL DAILY
Qty: 30 TABLET | Refills: 5 | Status: SHIPPED | OUTPATIENT
Start: 2020-10-12 | End: 2021-03-30 | Stop reason: SDUPTHER

## 2020-10-12 RX ORDER — FENOFIBRATE 90 MG/1
90 CAPSULE ORAL DAILY
Qty: 30 CAPSULE | Refills: 5 | Status: SHIPPED | OUTPATIENT
Start: 2020-10-12 | End: 2021-01-21 | Stop reason: CLARIF

## 2020-10-12 RX ORDER — FLUOXETINE HYDROCHLORIDE 20 MG/1
60 CAPSULE ORAL DAILY
Qty: 90 CAPSULE | Refills: 5 | Status: SHIPPED | OUTPATIENT
Start: 2020-10-12 | End: 2021-03-30 | Stop reason: SDUPTHER

## 2020-10-12 RX ORDER — ZOLPIDEM TARTRATE 10 MG/1
10 TABLET ORAL NIGHTLY PRN
Qty: 30 TABLET | Refills: 2 | Status: SHIPPED | OUTPATIENT
Start: 2020-10-12 | End: 2021-01-04 | Stop reason: SDUPTHER

## 2020-10-12 RX ORDER — LEVOTHYROXINE SODIUM 0.05 MG/1
50 TABLET ORAL DAILY
Qty: 30 TABLET | Refills: 5 | Status: SHIPPED | OUTPATIENT
Start: 2020-10-12 | End: 2021-03-30 | Stop reason: SDUPTHER

## 2020-11-20 NOTE — PROGRESS NOTES
Subjective   Willie Enciso is a 52 y.o. female  2, Para 1 AB 1, Living 1.  Last annual , last pap , last mammogram today, last colonoscopy 0.  Cc: Annual exam  History of Present Illness  She denies any gynecologic problems at this time  The following portions of the patient's history were reviewed and updated as appropriate: allergies, current medications, past family history, past medical history, past social history, past surgical history and problem list.    Review of Systems   Genitourinary:        Decreased libido   All other systems reviewed and are negative.        Past Medical History:   Diagnosis Date   • Acute reaction to stress    • Glaucoma    • H/O complete eye exam 2016   • Hyperlipidemia    • Hypothyroidism    • Insomnia    • Lateral epicondylitis    • Major depressive disorder, recurrent episode, in full remission      Menstrual History:  OB History      Para Term  AB Living    2 1 1  1 1    SAB TAB Ectopic Molar Multiple Live Births                  Menarche age: 15  No LMP recorded. Patient is postmenopausal.       Past Surgical History:   Procedure Laterality Date   • TONSILLECTOMY       OB History      Para Term  AB Living    2 1 1  1 1    SAB TAB Ectopic Molar Multiple Live Births                 Family History   Problem Relation Age of Onset   • Cancer Father      Hodgkin's, bladder cancer   • Hypertension Father    • Atrial fibrillation Father    • Macular degeneration Mother    • Hypertension Mother      History   Smoking Status   • Current Every Day Smoker   • Packs/day: 0.50   • Years: 35.00   • Types: Cigarettes   Smokeless Tobacco   • Never Used     History   Alcohol Use   • 5.4 oz/week   • 9 Cans of beer per week     Health Maintenance   Topic Date Due   • MAMMOGRAM  2018 (Originally 10/1/2016)   • PAP SMEAR  2018 (Originally 11/15/2016)   • INFLUENZA VACCINE  10/24/2018 (Originally 2017)   • LIPID PANEL  2018   •  This patient underwent umbilical herniorrhaphy for a very large ventral hernia above the level of the umbilicus and inverting the umbilicus. There was a greater than 6 cm sac with a 3 cm fascial defect. The procedure was performed on November 12, 2020. "COLONOSCOPY  09/01/2025   • TDAP/TD VACCINES (2 - Td) 09/14/2026   • PNEUMOCOCCAL VACCINE (19-64 MEDIUM RISK)  Addressed   • HEPATITIS C SCREENING  Excluded       Current Outpatient Prescriptions:   •  ALPRAZolam (XANAX) 0.5 MG tablet, Take 1 tablet by mouth 2 (Two) Times a Day., Disp: 60 tablet, Rfl: 2  •  ANTARA 90 MG capsule, Take 90 mg by mouth Daily., Disp: 30 capsule, Rfl: 5  •  FLUoxetine (PROzac) 20 MG capsule, Take 2 tablets QD, Disp: 60 capsule, Rfl: 5  •  levothyroxine (SYNTHROID, LEVOTHROID) 25 MCG tablet, Take 1 tablet by mouth Daily., Disp: 30 tablet, Rfl: 5  •  zolpidem (AMBIEN) 10 MG tablet, Take 1 tablet by mouth At Night As Needed for Sleep., Disp: 30 tablet, Rfl: 2  Sexual History: Active  STD: Negative       I advised the patient of the risks in continuing to use tobacco, and I provided this patient with smoking cessation educational materials.  I also discussed how to quit smoking and the patient has expressed the willingness to quit.      During this visit, I spent > 3-10 minutes counseling the patient regarding smoking cessation.       Objective   Vitals:    04/03/18 0900   BP: 126/82   Pulse: 66   Weight: 77.6 kg (171 lb)   Height: 170.2 cm (67\")     Physical Exam   Constitutional: She is oriented to person, place, and time. She appears well-developed and well-nourished.   HENT:   Head: Normocephalic.   Eyes: Pupils are equal, round, and reactive to light.   Neck: Normal range of motion. No thyromegaly present.   Cardiovascular: Normal rate, regular rhythm, normal heart sounds and intact distal pulses.    Pulmonary/Chest: Effort normal and breath sounds normal. No respiratory distress. She exhibits no tenderness. Right breast exhibits no inverted nipple, no mass, no nipple discharge, no skin change and no tenderness. Left breast exhibits no inverted nipple, no mass, no nipple discharge, no skin change and no tenderness. Breasts are symmetrical.   Abdominal: Soft. Bowel sounds are normal. " Hernia confirmed negative in the right inguinal area and confirmed negative in the left inguinal area.   Genitourinary: Rectum normal, vagina normal and uterus normal. Rectal exam shows no external hemorrhoid, no internal hemorrhoid, no fissure, no mass, no tenderness, anal tone normal and guaiac negative stool. No breast tenderness or discharge. Pelvic exam was performed with patient supine. There is no rash, tenderness, lesion or injury on the right labia. There is no rash, tenderness, lesion or injury on the left labia. Uterus is not enlarged and not tender. Cervix exhibits no motion tenderness, no discharge and no friability. Right adnexum displays no mass, no tenderness and no fullness. Left adnexum displays no mass, no tenderness and no fullness.   Lymphadenopathy:     She has no cervical adenopathy.        Right: No inguinal adenopathy present.        Left: No inguinal adenopathy present.   Neurological: She is alert and oriented to person, place, and time. She has normal reflexes.   Skin: Skin is warm and dry.   Psychiatric: She has a normal mood and affect. Her behavior is normal. Judgment and thought content normal.   Vitals reviewed.        Assessment/Plan   Diagnoses and all orders for this visit:    Tobacco use    Menopause    Encounter for gynecological examination without abnormal finding    Papanicolaou smear for cervical cancer screening      Aspiration was counseled about breast self-examination, frequency of Pap smears frequency of GYN examinations

## 2021-01-05 ENCOUNTER — TELEMEDICINE (OUTPATIENT)
Dept: FAMILY MEDICINE CLINIC | Facility: CLINIC | Age: 56
End: 2021-01-05

## 2021-01-05 DIAGNOSIS — F51.01 PRIMARY INSOMNIA: Chronic | ICD-10-CM

## 2021-01-05 DIAGNOSIS — F43.0 ACUTE REACTION TO STRESS: Chronic | ICD-10-CM

## 2021-01-05 PROCEDURE — 99214 OFFICE O/P EST MOD 30 MIN: CPT | Performed by: FAMILY MEDICINE

## 2021-01-05 RX ORDER — ZOLPIDEM TARTRATE 10 MG/1
10 TABLET ORAL NIGHTLY PRN
Qty: 30 TABLET | Refills: 2 | Status: SHIPPED | OUTPATIENT
Start: 2021-01-05 | End: 2021-03-30 | Stop reason: SDUPTHER

## 2021-01-05 RX ORDER — ALPRAZOLAM 0.5 MG/1
0.5 TABLET ORAL 2 TIMES DAILY
Qty: 60 TABLET | Refills: 2 | Status: SHIPPED | OUTPATIENT
Start: 2021-01-05 | End: 2021-03-30 | Stop reason: SDUPTHER

## 2021-01-05 NOTE — PROGRESS NOTES
Subjective   Willie Enciso is a 55 y.o. female.     CC: Video Visit for Medical Management    History of Present Illness     Chief Complaint:   Chief Complaint   Patient presents with   • Anxiety     stress  -med refill due    • Insomnia     [employer]    • Depression       Willie Enciso 55 y.o. female who presents today for Medical Management of the below listed issues and medication refills.  she has a problem list of   Patient Active Problem List   Diagnosis   • Hyperlipidemia   • Hypothyroidism   • Insomnia   • Major depressive disorder, recurrent episode, in full remission (CMS/HCC)   • Acute reaction to stress   .  Since the last visit, she has overall felt well.  she has been compliant with   Current Outpatient Medications:   •  ALPRAZolam (XANAX) 0.5 MG tablet, Take 1 tablet by mouth 2 (Two) Times a Day., Disp: 60 tablet, Rfl: 2  •  Antara 90 MG capsule, Take 90 mg by mouth Daily., Disp: 30 capsule, Rfl: 5  •  FLUoxetine (PROzac) 20 MG capsule, Take 3 capsules by mouth Daily., Disp: 90 capsule, Rfl: 5  •  levothyroxine (Synthroid) 50 MCG tablet, Take 1 tablet by mouth Daily., Disp: 30 tablet, Rfl: 5  •  liothyronine (Cytomel) 5 MCG tablet, Take 1 tablet by mouth Daily., Disp: 30 tablet, Rfl: 5  •  zolpidem (AMBIEN) 10 MG tablet, Take 1 tablet by mouth At Night As Needed for Sleep., Disp: 30 tablet, Rfl: 2.  she denies medication side effects.    All of the other chronic condition(s) listed above are stable w/o issues.    There were no vitals taken for this visit.    Results for orders placed or performed in visit on 07/21/20   Comprehensive metabolic panel    Specimen: Blood   Result Value Ref Range    Glucose 100 (H) 65 - 99 mg/dL    BUN 10 6 - 20 mg/dL    Creatinine 0.65 0.57 - 1.00 mg/dL    eGFR Non African Am 95 >60 mL/min/1.73    eGFR African Am 115 >60 mL/min/1.73    BUN/Creatinine Ratio 15.4 7.0 - 25.0    Sodium 139 136 - 145 mmol/L    Potassium 5.1 3.5 - 5.2 mmol/L    Chloride 102 98 - 107  mmol/L    Total CO2 27.1 22.0 - 29.0 mmol/L    Calcium 9.2 8.6 - 10.5 mg/dL    Total Protein 5.8 (L) 6.0 - 8.5 g/dL    Albumin 4.30 3.50 - 5.20 g/dL    Globulin 1.5 gm/dL    A/G Ratio 2.9 g/dL    Total Bilirubin 0.2 0.0 - 1.2 mg/dL    Alkaline Phosphatase 74 39 - 117 U/L    AST (SGOT) 15 1 - 32 U/L    ALT (SGPT) 13 1 - 33 U/L   Lipid panel    Specimen: Blood   Result Value Ref Range    Total Cholesterol 189 0 - 200 mg/dL    Triglycerides 69 0 - 150 mg/dL    HDL Cholesterol 53 40 - 60 mg/dL    VLDL Cholesterol Herman 13.8 mg/dL    LDL Chol Calc (NIH) 122 (H) 0 - 100 mg/dL   TSH    Specimen: Blood   Result Value Ref Range    TSH 2.130 0.270 - 4.200 uIU/mL   T3, Free    Specimen: Blood   Result Value Ref Range    T3, Free 1.9 (L) 2.0 - 4.4 pg/mL   T4, Free    Specimen: Blood   Result Value Ref Range    Free T4 1.32 0.93 - 1.70 ng/dL   SARS-CoV-2 Antibodies (Roche)    Specimen: Blood   Result Value Ref Range    SARS-COV-2 ANTIBODIES Negative Negative   CBC and Differential    Specimen: Blood   Result Value Ref Range    WBC 6.36 3.40 - 10.80 10*3/mm3    RBC 4.58 3.77 - 5.28 10*6/mm3    Hemoglobin 14.3 12.0 - 15.9 g/dL    Hematocrit 40.7 34.0 - 46.6 %    MCV 88.9 79.0 - 97.0 fL    MCH 31.2 26.6 - 33.0 pg    MCHC 35.1 31.5 - 35.7 g/dL    RDW 12.6 12.3 - 15.4 %    Platelets 293 140 - 450 10*3/mm3    Neutrophil Rel % 57.9 42.7 - 76.0 %    Lymphocyte Rel % 28.0 19.6 - 45.3 %    Monocyte Rel % 10.2 5.0 - 12.0 %    Eosinophil Rel % 2.5 0.3 - 6.2 %    Basophil Rel % 1.1 0.0 - 1.5 %    Neutrophils Absolute 3.68 1.70 - 7.00 10*3/mm3    Lymphocytes Absolute 1.78 0.70 - 3.10 10*3/mm3    Monocytes Absolute 0.65 0.10 - 0.90 10*3/mm3    Eosinophils Absolute 0.16 0.00 - 0.40 10*3/mm3    Basophils Absolute 0.07 0.00 - 0.20 10*3/mm3    Immature Granulocyte Rel % 0.3 0.0 - 0.5 %    Immature Grans Absolute 0.02 0.00 - 0.05 10*3/mm3    nRBC 0.0 0.0 - 0.2 /100 WBC           The following portions of the patient's history were reviewed and  updated as appropriate: allergies, current medications, past family history, past medical history, past social history, past surgical history and problem list.    Review of Systems   Constitutional: Negative for activity change, chills and fever.   Respiratory: Negative for cough.    Cardiovascular: Negative for chest pain.   Psychiatric/Behavioral: Negative for dysphoric mood.       Objective   Physical Exam  Constitutional:       General: She is not in acute distress.     Appearance: She is well-developed.   Pulmonary:      Effort: Pulmonary effort is normal.   Neurological:      Mental Status: She is alert and oriented to person, place, and time.   Psychiatric:         Behavior: Behavior normal.         Thought Content: Thought content normal.         Assessment/Plan   Diagnoses and all orders for this visit:    1. Primary insomnia  -     zolpidem (AMBIEN) 10 MG tablet; Take 1 tablet by mouth At Night As Needed for Sleep.  Dispense: 30 tablet; Refill: 2    2. Acute reaction to stress  -     ALPRAZolam (XANAX) 0.5 MG tablet; Take 1 tablet by mouth 2 (Two) Times a Day.  Dispense: 60 tablet; Refill: 2    Spent  15   minutes with chart and interview and consent for this encounter given by the patient.  You have chosen to receive care through a telehealth visit.  Do you consent to use a video/audio connection for your medical care today? Yes

## 2021-01-19 DIAGNOSIS — E78.2 MIXED HYPERLIPIDEMIA: ICD-10-CM

## 2021-01-19 RX ORDER — FENOFIBRATE 90 MG/1
90 CAPSULE ORAL DAILY
Qty: 30 CAPSULE | Refills: 5 | Status: CANCELLED | OUTPATIENT
Start: 2021-01-19

## 2021-01-19 NOTE — TELEPHONE ENCOUNTER
Caller: Willie Enciso    Relationship: Self    Best call back number: [phone]  Medication needed:   Requested Prescriptions     Pending Prescriptions Disp Refills   • Antara 90 MG capsule 30 capsule 5     Sig: Take 90 mg by mouth Daily.     What details did the patient provide when requesting the medication: PATIENT NEEDS A PA ON THIS MEDICATION PER INSURANCE. SHE HAS A COUPLE OF WEEKS LEFT, BUT WANTED TO MAKE SURE IT WAS IN PLACE WHEN SHE GOT HER REFILL   Does the patient have less than a 3 day supply:  [] Yes  [x] No    What is the patient's preferred pharmacy:    [facility] [place], [place] - [address] [facility] [phone] [facility] [phone]   P: 5

## 2021-01-21 DIAGNOSIS — E78.2 MIXED HYPERLIPIDEMIA: Primary | ICD-10-CM

## 2021-01-21 RX ORDER — FENOFIBRATE 130 MG/1
130 CAPSULE ORAL
Qty: 30 CAPSULE | Refills: 5 | Status: SHIPPED | OUTPATIENT
Start: 2021-01-21 | End: 2021-03-30 | Stop reason: SDUPTHER

## 2021-03-26 ENCOUNTER — BULK ORDERING (OUTPATIENT)
Dept: CASE MANAGEMENT | Facility: OTHER | Age: 56
End: 2021-03-26

## 2021-03-26 DIAGNOSIS — Z23 IMMUNIZATION DUE: ICD-10-CM

## 2021-03-30 NOTE — PROGRESS NOTES
"Subjective   Willie Enciso is a 55 y.o. female.     History of Present Illness     Chief Complaint:   Chief Complaint   Patient presents with   • Hypothyroidism     med refill  - no labs -   • Insomnia     mammogram due     • Anxiety   • referral to gyn       Willie Enciso 55 y.o. female who presents today for Medical Management of the below listed issues and medication refills.  she has a problem list of   Patient Active Problem List   Diagnosis   • Hyperlipidemia   • Hypothyroidism   • Insomnia   • Major depressive disorder, recurrent episode, in full remission (CMS/HCC)   • Acute reaction to stress   .  Since the last visit, she has overall felt well.  she has been compliant with   Current Outpatient Medications:   •  ALPRAZolam (XANAX) 0.5 MG tablet, Take 1 tablet by mouth 2 (Two) Times a Day., Disp: 60 tablet, Rfl: 2  •  FLUoxetine (PROzac) 20 MG capsule, Take 3 capsules by mouth Daily., Disp: 90 capsule, Rfl: 5  •  levothyroxine (Synthroid) 50 MCG tablet, Take 1 tablet by mouth Daily., Disp: 30 tablet, Rfl: 5  •  liothyronine (Cytomel) 5 MCG tablet, Take 1 tablet by mouth Daily., Disp: 30 tablet, Rfl: 5  •  zolpidem (AMBIEN) 10 MG tablet, Take 1 tablet by mouth At Night As Needed for Sleep., Disp: 30 tablet, Rfl: 2  •  fenofibrate micronized (ANTARA) 130 MG capsule, Take 1 capsule by mouth Every Morning Before Breakfast., Disp: 30 capsule, Rfl: 5.  she denies medication side effects.    All of the other chronic condition(s) listed above are stable w/o issues.    /86   Pulse 68   Temp 97 °F (36.1 °C) (Oral)   Resp 16   Ht 170.2 cm (67\")   Wt 67.6 kg (149 lb)   BMI 23.34 kg/m²     Results for orders placed or performed in visit on 07/21/20   Comprehensive metabolic panel    Specimen: Blood   Result Value Ref Range    Glucose 100 (H) 65 - 99 mg/dL    BUN 10 6 - 20 mg/dL    Creatinine 0.65 0.57 - 1.00 mg/dL    eGFR Non African Am 95 >60 mL/min/1.73    eGFR African Am 115 >60 mL/min/1.73    " BUN/Creatinine Ratio 15.4 7.0 - 25.0    Sodium 139 136 - 145 mmol/L    Potassium 5.1 3.5 - 5.2 mmol/L    Chloride 102 98 - 107 mmol/L    Total CO2 27.1 22.0 - 29.0 mmol/L    Calcium 9.2 8.6 - 10.5 mg/dL    Total Protein 5.8 (L) 6.0 - 8.5 g/dL    Albumin 4.30 3.50 - 5.20 g/dL    Globulin 1.5 gm/dL    A/G Ratio 2.9 g/dL    Total Bilirubin 0.2 0.0 - 1.2 mg/dL    Alkaline Phosphatase 74 39 - 117 U/L    AST (SGOT) 15 1 - 32 U/L    ALT (SGPT) 13 1 - 33 U/L   Lipid panel    Specimen: Blood   Result Value Ref Range    Total Cholesterol 189 0 - 200 mg/dL    Triglycerides 69 0 - 150 mg/dL    HDL Cholesterol 53 40 - 60 mg/dL    VLDL Cholesterol Herman 13.8 mg/dL    LDL Chol Calc ([facility]) 122 (H) 0 - 100 mg/dL   TSH    Specimen: Blood   Result Value Ref Range    TSH 2.130 0.270 - 4.200 uIU/mL   T3, Free    Specimen: Blood   Result Value Ref Range    T3, Free 1.9 (L) 2.0 - 4.4 pg/mL   T4, Free    Specimen: Blood   Result Value Ref Range    Free T4 1.32 0.93 - 1.70 ng/dL   SARS-CoV-2 Antibodies ([facility])    Specimen: Blood   Result Value Ref Range    SARS-COV-2 ANTIBODIES Negative Negative   CBC and Differential    Specimen: Blood   Result Value Ref Range    WBC 6.36 3.40 - 10.80 10*3/mm3    RBC 4.58 3.77 - 5.28 10*6/mm3    Hemoglobin 14.3 12.0 - 15.9 g/dL    Hematocrit 40.7 34.0 - 46.6 %    MCV 88.9 79.0 - 97.0 fL    MCH 31.2 26.6 - 33.0 pg    MCHC 35.1 31.5 - 35.7 g/dL    RDW 12.6 12.3 - 15.4 %    Platelets 293 140 - 450 10*3/mm3    Neutrophil Rel % 57.9 42.7 - 76.0 %    Lymphocyte Rel % 28.0 19.6 - 45.3 %    Monocyte Rel % 10.2 5.0 - 12.0 %    Eosinophil Rel % 2.5 0.3 - 6.2 %    Basophil Rel % 1.1 0.0 - 1.5 %    Neutrophils Absolute 3.68 1.70 - 7.00 10*3/mm3    Lymphocytes Absolute 1.78 0.70 - 3.10 10*3/mm3    Monocytes Absolute 0.65 0.10 - 0.90 10*3/mm3    Eosinophils Absolute 0.16 0.00 - 0.40 10*3/mm3    Basophils Absolute 0.07 0.00 - 0.20 10*3/mm3    Immature Granulocyte Rel % 0.3 0.0 - 0.5 %    Immature Grans Absolute 0.02 0.00 -  0.05 10*3/mm3    nRBC 0.0 0.0 - 0.2 /100 WBC           The following portions of the patient's history were reviewed and updated as appropriate: allergies, current medications, past family history, past medical history, past social history, past surgical history and problem list.    Review of Systems   Constitutional: Negative for activity change, chills and fever.   Respiratory: Negative for cough.    Cardiovascular: Negative for chest pain.   Psychiatric/Behavioral: Negative for dysphoric mood.       Objective   Physical Exam  Constitutional:       General: She is not in acute distress.     Appearance: She is well-developed.   Cardiovascular:      Rate and Rhythm: Normal rate.   Pulmonary:      Effort: Pulmonary effort is normal.      Breath sounds: Normal breath sounds.   Neurological:      Mental Status: She is alert and oriented to person, place, and time.   Psychiatric:         Behavior: Behavior normal.         Thought Content: Thought content normal.       The patient has read and signed the [facility] Controlled Substance Contract.  I will continue to see patient for regular follow up appointments.  They are well controlled on their medication.  CHIQUI has been reviewed by me and is updated every 3 months. The patient is aware of the potential for addiction and dependence.    Assessment/Plan   Diagnoses and all orders for this visit:    1. Acute reaction to stress (Primary)  -     ALPRAZolam (XANAX) 0.5 MG tablet; Take 1 tablet by mouth 2 (Two) Times a Day.  Dispense: 60 tablet; Refill: 2    2. Primary insomnia  -     zolpidem (AMBIEN) 10 MG tablet; Take 1 tablet by mouth At Night As Needed for Sleep.  Dispense: 30 tablet; Refill: 2    3. Major depressive disorder, recurrent episode, in full remission (CMS/[facility])  -     FLUoxetine (PROzac) 20 MG capsule; Take 3 capsules by mouth Daily.  Dispense: 90 capsule; Refill: 5    4. Acquired hypothyroidism  -     liothyronine (Cytomel) 5 MCG tablet; Take 1 tablet  by mouth Daily.  Dispense: 30 tablet; Refill: 5  -     levothyroxine (Synthroid) 50 MCG tablet; Take 1 tablet by mouth Daily.  Dispense: 30 tablet; Refill: 5  -     TSH  -     T4, Free  -     T3, Free    5. Mixed hyperlipidemia  -     fenofibrate micronized (ANTARA) 130 MG capsule; Take 1 capsule by mouth Every Morning Before Breakfast.  Dispense: 30 capsule; Refill: 5    6. Encntr for gyn exam (general) (routine) w/o abn findings  Comments:  for referral only, don't bill  Orders:  -     Ambulatory Referral to Gynecology

## 2021-03-31 ENCOUNTER — OFFICE VISIT (OUTPATIENT)
Dept: FAMILY MEDICINE CLINIC | Facility: CLINIC | Age: 56
End: 2021-03-31

## 2021-03-31 VITALS
WEIGHT: 149 LBS | SYSTOLIC BLOOD PRESSURE: 128 MMHG | HEART RATE: 68 BPM | HEIGHT: 67 IN | BODY MASS INDEX: 23.39 KG/M2 | DIASTOLIC BLOOD PRESSURE: 86 MMHG | TEMPERATURE: 97 F | RESPIRATION RATE: 16 BRPM

## 2021-03-31 DIAGNOSIS — Z01.419 ENCNTR FOR GYN EXAM (GENERAL) (ROUTINE) W/O ABN FINDINGS: ICD-10-CM

## 2021-03-31 DIAGNOSIS — E03.9 ACQUIRED HYPOTHYROIDISM: Chronic | ICD-10-CM

## 2021-03-31 DIAGNOSIS — F43.0 ACUTE REACTION TO STRESS: Primary | Chronic | ICD-10-CM

## 2021-03-31 DIAGNOSIS — F33.42 MAJOR DEPRESSIVE DISORDER, RECURRENT EPISODE, IN FULL REMISSION (HCC): Chronic | ICD-10-CM

## 2021-03-31 DIAGNOSIS — E78.2 MIXED HYPERLIPIDEMIA: Chronic | ICD-10-CM

## 2021-03-31 DIAGNOSIS — F51.01 PRIMARY INSOMNIA: Chronic | ICD-10-CM

## 2021-03-31 PROCEDURE — 99214 OFFICE O/P EST MOD 30 MIN: CPT | Performed by: FAMILY MEDICINE

## 2021-03-31 RX ORDER — FENOFIBRATE 130 MG/1
130 CAPSULE ORAL
Qty: 30 CAPSULE | Refills: 5 | Status: SHIPPED | OUTPATIENT
Start: 2021-03-31 | End: 2021-11-01 | Stop reason: SDUPTHER

## 2021-03-31 RX ORDER — ALPRAZOLAM 0.5 MG/1
0.5 TABLET ORAL 2 TIMES DAILY
Qty: 60 TABLET | Refills: 2 | Status: SHIPPED | OUTPATIENT
Start: 2021-03-31 | End: 2021-08-02 | Stop reason: SDUPTHER

## 2021-03-31 RX ORDER — ZOLPIDEM TARTRATE 10 MG/1
10 TABLET ORAL NIGHTLY PRN
Qty: 30 TABLET | Refills: 2 | Status: SHIPPED | OUTPATIENT
Start: 2021-03-31 | End: 2021-08-02 | Stop reason: SDUPTHER

## 2021-03-31 RX ORDER — LEVOTHYROXINE SODIUM 0.05 MG/1
50 TABLET ORAL DAILY
Qty: 30 TABLET | Refills: 5 | Status: SHIPPED | OUTPATIENT
Start: 2021-03-31 | End: 2021-11-01 | Stop reason: SDUPTHER

## 2021-03-31 RX ORDER — FLUOXETINE HYDROCHLORIDE 20 MG/1
60 CAPSULE ORAL DAILY
Qty: 90 CAPSULE | Refills: 5 | Status: SHIPPED | OUTPATIENT
Start: 2021-03-31 | End: 2021-11-01 | Stop reason: SDUPTHER

## 2021-03-31 RX ORDER — LIOTHYRONINE SODIUM 5 UG/1
5 TABLET ORAL DAILY
Qty: 30 TABLET | Refills: 5 | Status: SHIPPED | OUTPATIENT
Start: 2021-03-31 | End: 2021-11-01 | Stop reason: SDUPTHER

## 2021-04-01 LAB
T3FREE SERPL-MCNC: 2.8 PG/ML (ref 2–4.4)
T4 FREE SERPL-MCNC: 1.45 NG/DL (ref 0.93–1.7)
TSH SERPL DL<=0.005 MIU/L-ACNC: 2.77 UIU/ML (ref 0.27–4.2)

## 2021-05-12 ENCOUNTER — TELEPHONE (OUTPATIENT)
Dept: OBSTETRICS AND GYNECOLOGY | Facility: CLINIC | Age: 56
End: 2021-05-12

## 2021-05-12 NOTE — TELEPHONE ENCOUNTER
Attempted call to [phone], no answer, unable to l/m.    L/m on [phone] for pt to call to schedule est pt w/Dr. Katz for annual exam.   Referred back to Dr. Katz by Dr. Tom Montoya.      [facility] has also made several attempts to reach pt via phone to schedule.  Letter mailed for pt to call to schedule.

## 2021-08-02 NOTE — PROGRESS NOTES
"Subjective   Willie Enciso is a 55 y.o. female.     History of Present Illness     Chief Complaint:   Chief Complaint   Patient presents with   • Insomnia   • Acute reaction to stress       Willie Enciso 55 y.o. female who presents today for Medical Management of the below listed issues and medication refills.  she has a problem list of   Patient Active Problem List   Diagnosis   • Hyperlipidemia   • Hypothyroidism   • Insomnia   • Major depressive disorder, recurrent episode, in full remission (CMS/HCC)   • Acute reaction to stress   .  Since the last visit, she has overall felt well.  she has been compliant with   Current Outpatient Medications:   •  fenofibrate micronized (ANTARA) 130 MG capsule, Take 1 capsule by mouth Every Morning Before Breakfast., Disp: 30 capsule, Rfl: 5  •  FLUoxetine (PROzac) 20 MG capsule, Take 3 capsules by mouth Daily., Disp: 90 capsule, Rfl: 5  •  levothyroxine (Synthroid) 50 MCG tablet, Take 1 tablet by mouth Daily., Disp: 30 tablet, Rfl: 5  •  liothyronine (Cytomel) 5 MCG tablet, Take 1 tablet by mouth Daily., Disp: 30 tablet, Rfl: 5  •  ALPRAZolam (XANAX) 0.5 MG tablet, Take 1 tablet by mouth 2 (Two) Times a Day., Disp: 60 tablet, Rfl: 2  •  zolpidem (AMBIEN) 10 MG tablet, Take 1 tablet by mouth At Night As Needed for Sleep., Disp: 30 tablet, Rfl: 2.  she denies medication side effects.    All of the other chronic condition(s) listed above are stable w/o issues.    /76   Pulse 59   Temp 96 °F (35.6 °C)   Resp 16   Ht 170.2 cm (67\")   Wt 68.5 kg (151 lb)   SpO2 98%   BMI 23.65 kg/m²     Results for orders placed or performed in visit on 03/31/21   TSH    Specimen: Blood   Result Value Ref Range    TSH 2.770 0.270 - 4.200 uIU/mL   T4, Free    Specimen: Blood   Result Value Ref Range    Free T4 1.45 0.93 - 1.70 ng/dL   T3, Free    Specimen: Blood   Result Value Ref Range    T3, Free 2.8 2.0 - 4.4 pg/mL           The following portions of the patient's history were " reviewed and updated as appropriate: allergies, current medications, past family history, past medical history, past social history, past surgical history and problem list.    Review of Systems   Constitutional: Negative for activity change, chills and fever.   Respiratory: Negative for cough.    Cardiovascular: Negative for chest pain.   Psychiatric/Behavioral: Negative for dysphoric mood.       Objective   Physical Exam  Constitutional:       General: She is not in acute distress.     Appearance: She is well-developed.   Pulmonary:      Effort: Pulmonary effort is normal.   Neurological:      Mental Status: She is alert and oriented to person, place, and time.   Psychiatric:         Behavior: Behavior normal.         Thought Content: Thought content normal.       The patient has read and signed the [facility] Controlled Substance Contract.  I will continue to see patient for regular follow up appointments.  They are well controlled on their medication.  CHIQUI has been reviewed by me and is updated every 3 months. The patient is aware of the potential for addiction and dependence.    Assessment/Plan   Diagnoses and all orders for this visit:    1. Acute reaction to stress  -     ALPRAZolam (XANAX) 0.5 MG tablet; Take 1 tablet by mouth 2 (Two) Times a Day.  Dispense: 60 tablet; Refill: 2    2. Primary insomnia  -     zolpidem (AMBIEN) 10 MG tablet; Take 1 tablet by mouth At Night As Needed for Sleep.  Dispense: 30 tablet; Refill: 2

## 2021-08-03 ENCOUNTER — OFFICE VISIT (OUTPATIENT)
Dept: FAMILY MEDICINE CLINIC | Facility: CLINIC | Age: 56
End: 2021-08-03

## 2021-08-03 VITALS
OXYGEN SATURATION: 98 % | RESPIRATION RATE: 16 BRPM | TEMPERATURE: 96 F | HEART RATE: 59 BPM | BODY MASS INDEX: 23.7 KG/M2 | DIASTOLIC BLOOD PRESSURE: 76 MMHG | WEIGHT: 151 LBS | HEIGHT: 67 IN | SYSTOLIC BLOOD PRESSURE: 124 MMHG

## 2021-08-03 DIAGNOSIS — F51.01 PRIMARY INSOMNIA: Chronic | ICD-10-CM

## 2021-08-03 DIAGNOSIS — F43.0 ACUTE REACTION TO STRESS: Chronic | ICD-10-CM

## 2021-08-03 PROCEDURE — 99214 OFFICE O/P EST MOD 30 MIN: CPT | Performed by: FAMILY MEDICINE

## 2021-08-03 RX ORDER — ALPRAZOLAM 0.5 MG/1
0.5 TABLET ORAL 2 TIMES DAILY
Qty: 60 TABLET | Refills: 2 | Status: SHIPPED | OUTPATIENT
Start: 2021-08-03 | End: 2021-11-01 | Stop reason: SDUPTHER

## 2021-08-03 RX ORDER — ZOLPIDEM TARTRATE 10 MG/1
10 TABLET ORAL NIGHTLY PRN
Qty: 30 TABLET | Refills: 2 | Status: SHIPPED | OUTPATIENT
Start: 2021-08-03 | End: 2021-11-01 | Stop reason: SDUPTHER

## 2021-11-02 ENCOUNTER — OFFICE VISIT (OUTPATIENT)
Dept: FAMILY MEDICINE CLINIC | Facility: CLINIC | Age: 56
End: 2021-11-02

## 2021-11-02 VITALS
WEIGHT: 149 LBS | BODY MASS INDEX: 23.39 KG/M2 | HEIGHT: 67 IN | RESPIRATION RATE: 14 BRPM | HEART RATE: 60 BPM | TEMPERATURE: 96.9 F | DIASTOLIC BLOOD PRESSURE: 76 MMHG | SYSTOLIC BLOOD PRESSURE: 110 MMHG

## 2021-11-02 DIAGNOSIS — F43.0 ACUTE REACTION TO STRESS: Chronic | ICD-10-CM

## 2021-11-02 DIAGNOSIS — F33.42 MAJOR DEPRESSIVE DISORDER, RECURRENT EPISODE, IN FULL REMISSION (HCC): Chronic | ICD-10-CM

## 2021-11-02 DIAGNOSIS — F51.01 PRIMARY INSOMNIA: Chronic | ICD-10-CM

## 2021-11-02 DIAGNOSIS — E78.2 MIXED HYPERLIPIDEMIA: Chronic | ICD-10-CM

## 2021-11-02 DIAGNOSIS — Z00.00 ANNUAL PHYSICAL EXAM: ICD-10-CM

## 2021-11-02 DIAGNOSIS — E03.9 ACQUIRED HYPOTHYROIDISM: Primary | Chronic | ICD-10-CM

## 2021-11-02 PROCEDURE — 99214 OFFICE O/P EST MOD 30 MIN: CPT | Performed by: FAMILY MEDICINE

## 2021-11-02 RX ORDER — ALPRAZOLAM 0.5 MG/1
0.5 TABLET ORAL 2 TIMES DAILY
Qty: 60 TABLET | Refills: 2 | Status: SHIPPED | OUTPATIENT
Start: 2021-11-02 | End: 2022-02-08 | Stop reason: SDUPTHER

## 2021-11-02 RX ORDER — LEVOTHYROXINE SODIUM 0.05 MG/1
50 TABLET ORAL DAILY
Qty: 30 TABLET | Refills: 5 | Status: SHIPPED | OUTPATIENT
Start: 2021-11-02 | End: 2022-06-02 | Stop reason: SDUPTHER

## 2021-11-02 RX ORDER — LIOTHYRONINE SODIUM 5 UG/1
5 TABLET ORAL DAILY
Qty: 30 TABLET | Refills: 5 | Status: SHIPPED | OUTPATIENT
Start: 2021-11-02 | End: 2022-06-02 | Stop reason: SDUPTHER

## 2021-11-02 RX ORDER — FLUOXETINE HYDROCHLORIDE 20 MG/1
60 CAPSULE ORAL DAILY
Qty: 90 CAPSULE | Refills: 5 | Status: SHIPPED | OUTPATIENT
Start: 2021-11-02 | End: 2022-06-02 | Stop reason: SDUPTHER

## 2021-11-02 RX ORDER — ZOLPIDEM TARTRATE 10 MG/1
10 TABLET ORAL NIGHTLY PRN
Qty: 30 TABLET | Refills: 2 | Status: SHIPPED | OUTPATIENT
Start: 2021-11-02 | End: 2022-02-08 | Stop reason: SDUPTHER

## 2021-11-02 RX ORDER — FENOFIBRATE 130 MG/1
130 CAPSULE ORAL
Qty: 30 CAPSULE | Refills: 5 | Status: SHIPPED | OUTPATIENT
Start: 2021-11-02 | End: 2022-06-02 | Stop reason: SDUPTHER

## 2021-11-02 NOTE — PROGRESS NOTES
"Subjective   Willie Enciso is a 56 y.o. female.     History of Present Illness     Chief Complaint:   Chief Complaint   Patient presents with   • Hypothyroidism     med refill due    • Hyperlipidemia     NO LABS  / value market / meds reviewed with pt today    • Anxiety   • Depression   • Heartburn       Willie Enciso 56 y.o. female who presents today for Medical Management of the below listed issues and medication refills. She  has a problem list of   Patient Active Problem List   Diagnosis   • Hyperlipidemia   • Hypothyroidism   • Insomnia   • Major depressive disorder, recurrent episode, in full remission (HCC)   • Acute reaction to stress   .  Since the last visit, She has overall felt well.  she has been compliant with   Current Outpatient Medications:   •  ALPRAZolam (XANAX) 0.5 MG tablet, Take 1 tablet by mouth 2 (Two) Times a Day., Disp: 60 tablet, Rfl: 2  •  fenofibrate micronized (ANTARA) 130 MG capsule, Take 1 capsule by mouth Every Morning Before Breakfast., Disp: 30 capsule, Rfl: 5  •  FLUoxetine (PROzac) 20 MG capsule, Take 3 capsules by mouth Daily., Disp: 90 capsule, Rfl: 5  •  levothyroxine (Synthroid) 50 MCG tablet, Take 1 tablet by mouth Daily., Disp: 30 tablet, Rfl: 5  •  liothyronine (Cytomel) 5 MCG tablet, Take 1 tablet by mouth Daily., Disp: 30 tablet, Rfl: 5  •  zolpidem (AMBIEN) 10 MG tablet, Take 1 tablet by mouth At Night As Needed for Sleep., Disp: 30 tablet, Rfl: 2.  She denies medication side effects.    All of the other chronic condition(s) listed above are stable w/o issues.    /76   Pulse 60   Temp 96.9 °F (36.1 °C) (Oral)   Resp 14   Ht 170.2 cm (67\")   Wt 67.6 kg (149 lb)   BMI 23.34 kg/m²     Results for orders placed or performed in visit on 03/31/21   TSH    Specimen: Blood   Result Value Ref Range    TSH 2.770 0.270 - 4.200 uIU/mL   T4, Free    Specimen: Blood   Result Value Ref Range    Free T4 1.45 0.93 - 1.70 ng/dL   T3, Free    Specimen: Blood   Result Value Ref " Range    T3, Free 2.8 2.0 - 4.4 pg/mL             The following portions of the patient's history were reviewed and updated as appropriate: allergies, current medications, past family history, past medical history, past social history, past surgical history, and problem list.    Review of Systems   Constitutional: Negative for activity change, chills and fever.   Respiratory: Negative for cough.    Cardiovascular: Negative for chest pain.   Psychiatric/Behavioral: Negative for dysphoric mood.       Objective   Physical Exam  Constitutional:       General: She is not in acute distress.     Appearance: She is well-developed.   Cardiovascular:      Rate and Rhythm: Normal rate and regular rhythm.   Pulmonary:      Effort: Pulmonary effort is normal.      Breath sounds: Normal breath sounds.   Neurological:      Mental Status: She is alert and oriented to person, place, and time.   Psychiatric:         Behavior: Behavior normal.         Thought Content: Thought content normal.           Assessment/Plan   Diagnoses and all orders for this visit:    1. Acquired hypothyroidism (Primary)  -     liothyronine (Cytomel) 5 MCG tablet; Take 1 tablet by mouth Daily.  Dispense: 30 tablet; Refill: 5  -     levothyroxine (Synthroid) 50 MCG tablet; Take 1 tablet by mouth Daily.  Dispense: 30 tablet; Refill: 5  -     T3, Free  -     T4, Free  -     TSH    2. Major depressive disorder, recurrent episode, in full remission (HCC)  -     FLUoxetine (PROzac) 20 MG capsule; Take 3 capsules by mouth Daily.  Dispense: 90 capsule; Refill: 5    3. Primary insomnia  -     zolpidem (AMBIEN) 10 MG tablet; Take 1 tablet by mouth At Night As Needed for Sleep.  Dispense: 30 tablet; Refill: 2    4. Mixed hyperlipidemia  -     fenofibrate micronized (ANTARA) 130 MG capsule; Take 1 capsule by mouth Every Morning Before Breakfast.  Dispense: 30 capsule; Refill: 5  -     Lipid panel    5. Acute reaction to stress  -     ALPRAZolam (XANAX) 0.5 MG tablet;  Take 1 tablet by mouth 2 (Two) Times a Day.  Dispense: 60 tablet; Refill: 2    6. Annual physical exam  Comments:  for labs only, don't bill  Orders:  -     Comprehensive metabolic panel  -     CBC and Differential

## 2021-11-03 LAB
ALBUMIN SERPL-MCNC: 4 G/DL (ref 3.8–4.9)
ALBUMIN/GLOB SERPL: 1.9 {RATIO} (ref 1.2–2.2)
ALP SERPL-CCNC: 56 IU/L (ref 44–121)
ALT SERPL-CCNC: 9 IU/L (ref 0–32)
AST SERPL-CCNC: 14 IU/L (ref 0–40)
BASOPHILS # BLD AUTO: 0.1 X10E3/UL (ref 0–0.2)
BASOPHILS NFR BLD AUTO: 1 %
BILIRUB SERPL-MCNC: 0.3 MG/DL (ref 0–1.2)
BUN SERPL-MCNC: 18 MG/DL (ref 6–24)
BUN/CREAT SERPL: 20 (ref 9–23)
CALCIUM SERPL-MCNC: 9.4 MG/DL (ref 8.7–10.2)
CHLORIDE SERPL-SCNC: 106 MMOL/L (ref 96–106)
CHOLEST SERPL-MCNC: 176 MG/DL (ref 100–199)
CO2 SERPL-SCNC: 25 MMOL/L (ref 20–29)
CREAT SERPL-MCNC: 0.9 MG/DL (ref 0.57–1)
EOSINOPHIL # BLD AUTO: 0.1 X10E3/UL (ref 0–0.4)
EOSINOPHIL NFR BLD AUTO: 2 %
ERYTHROCYTE [DISTWIDTH] IN BLOOD BY AUTOMATED COUNT: 13.1 % (ref 11.7–15.4)
GLOBULIN SER CALC-MCNC: 2.1 G/DL (ref 1.5–4.5)
GLUCOSE SERPL-MCNC: 106 MG/DL (ref 65–99)
HCT VFR BLD AUTO: 42.1 % (ref 34–46.6)
HDLC SERPL-MCNC: 64 MG/DL
HGB BLD-MCNC: 14.5 G/DL (ref 11.1–15.9)
IMM GRANULOCYTES # BLD AUTO: 0 X10E3/UL (ref 0–0.1)
IMM GRANULOCYTES NFR BLD AUTO: 0 %
LDLC SERPL CALC-MCNC: 99 MG/DL (ref 0–99)
LYMPHOCYTES # BLD AUTO: 1.3 X10E3/UL (ref 0.7–3.1)
LYMPHOCYTES NFR BLD AUTO: 23 %
MCH RBC QN AUTO: 31.7 PG (ref 26.6–33)
MCHC RBC AUTO-ENTMCNC: 34.4 G/DL (ref 31.5–35.7)
MCV RBC AUTO: 92 FL (ref 79–97)
MONOCYTES # BLD AUTO: 0.5 X10E3/UL (ref 0.1–0.9)
MONOCYTES NFR BLD AUTO: 8 %
NEUTROPHILS # BLD AUTO: 3.6 X10E3/UL (ref 1.4–7)
NEUTROPHILS NFR BLD AUTO: 66 %
PLATELET # BLD AUTO: 307 X10E3/UL (ref 150–450)
POTASSIUM SERPL-SCNC: 4.5 MMOL/L (ref 3.5–5.2)
PROT SERPL-MCNC: 6.1 G/DL (ref 6–8.5)
RBC # BLD AUTO: 4.57 X10E6/UL (ref 3.77–5.28)
SODIUM SERPL-SCNC: 143 MMOL/L (ref 134–144)
T3FREE SERPL-MCNC: 3.3 PG/ML (ref 2–4.4)
T4 FREE SERPL-MCNC: 1.39 NG/DL (ref 0.82–1.77)
TRIGL SERPL-MCNC: 67 MG/DL (ref 0–149)
TSH SERPL DL<=0.005 MIU/L-ACNC: 2.1 UIU/ML (ref 0.45–4.5)
VLDLC SERPL CALC-MCNC: 13 MG/DL (ref 5–40)
WBC # BLD AUTO: 5.6 X10E3/UL (ref 3.4–10.8)

## 2021-11-03 NOTE — PROGRESS NOTES
Your labs look good except for the glucose, which is a little elevated. Keep exercising and watching your diet closely.

## 2022-02-10 ENCOUNTER — TELEMEDICINE (OUTPATIENT)
Dept: FAMILY MEDICINE CLINIC | Facility: CLINIC | Age: 57
End: 2022-02-10

## 2022-02-10 DIAGNOSIS — F51.01 PRIMARY INSOMNIA: Chronic | ICD-10-CM

## 2022-02-10 DIAGNOSIS — F43.0 ACUTE REACTION TO STRESS: Primary | Chronic | ICD-10-CM

## 2022-02-10 PROCEDURE — 99214 OFFICE O/P EST MOD 30 MIN: CPT | Performed by: FAMILY MEDICINE

## 2022-02-10 RX ORDER — ZOLPIDEM TARTRATE 10 MG/1
10 TABLET ORAL NIGHTLY PRN
Qty: 30 TABLET | Refills: 2 | Status: SHIPPED | OUTPATIENT
Start: 2022-02-10 | End: 2022-06-02 | Stop reason: SDUPTHER

## 2022-02-10 RX ORDER — ALPRAZOLAM 0.5 MG/1
0.5 TABLET ORAL 2 TIMES DAILY
Qty: 60 TABLET | Refills: 2 | Status: SHIPPED | OUTPATIENT
Start: 2022-02-10 | End: 2022-06-02 | Stop reason: SDUPTHER

## 2022-02-10 NOTE — PROGRESS NOTES
Subjective   Willie Enciso is a 56 y.o. female.     CC: Video Visit for Medical Management    History of Present Illness     Chief Complaint:   Chief Complaint   Patient presents with   • Anxiety     video / med refill    • Depression   • Insomnia       Willie Enciso 56 y.o. female who presents today for Medical Management of the below listed issues and medication refills. She  has a problem list of   Patient Active Problem List   Diagnosis   • Hyperlipidemia   • Hypothyroidism   • Insomnia   • Major depressive disorder, recurrent episode, in full remission (HCC)   • Acute reaction to stress   .  Since the last visit, She has overall felt well.  she has been compliant with   Current Outpatient Medications:   •  ALPRAZolam (XANAX) 0.5 MG tablet, Take 1 tablet by mouth 2 (Two) Times a Day., Disp: 60 tablet, Rfl: 2  •  zolpidem (AMBIEN) 10 MG tablet, Take 1 tablet by mouth At Night As Needed for Sleep., Disp: 30 tablet, Rfl: 2  •  fenofibrate micronized (ANTARA) 130 MG capsule, Take 1 capsule by mouth Every Morning Before Breakfast., Disp: 30 capsule, Rfl: 5  •  FLUoxetine (PROzac) 20 MG capsule, Take 3 capsules by mouth Daily., Disp: 90 capsule, Rfl: 5  •  levothyroxine (Synthroid) 50 MCG tablet, Take 1 tablet by mouth Daily., Disp: 30 tablet, Rfl: 5  •  liothyronine (Cytomel) 5 MCG tablet, Take 1 tablet by mouth Daily., Disp: 30 tablet, Rfl: 5.  She denies medication side effects.    All of the other chronic condition(s) listed above are stable w/o issues.    There were no vitals taken for this visit.    Results for orders placed or performed in visit on 11/02/21   T3, Free    Specimen: Blood   Result Value Ref Range    T3, Free 3.3 2.0 - 4.4 pg/mL   T4, Free    Specimen: Blood   Result Value Ref Range    Free T4 1.39 0.82 - 1.77 ng/dL   Comprehensive metabolic panel    Specimen: Blood   Result Value Ref Range    Glucose 106 (H) 65 - 99 mg/dL    BUN 18 6 - 24 mg/dL    Creatinine 0.90 0.57 - 1.00 mg/dL    eGFR Non   Am 72 >59 mL/min/1.73    eGFR African Am 83 >59 mL/min/1.73    BUN/Creatinine Ratio 20 9 - 23    Sodium 143 134 - 144 mmol/L    Potassium 4.5 3.5 - 5.2 mmol/L    Chloride 106 96 - 106 mmol/L    Total CO2 25 20 - 29 mmol/L    Calcium 9.4 8.7 - 10.2 mg/dL    Total Protein 6.1 6.0 - 8.5 g/dL    Albumin 4.0 3.8 - 4.9 g/dL    Globulin 2.1 1.5 - 4.5 g/dL    A/G Ratio 1.9 1.2 - 2.2    Total Bilirubin 0.3 0.0 - 1.2 mg/dL    Alkaline Phosphatase 56 44 - 121 IU/L    AST (SGOT) 14 0 - 40 IU/L    ALT (SGPT) 9 0 - 32 IU/L   Lipid panel    Specimen: Blood   Result Value Ref Range    Total Cholesterol 176 100 - 199 mg/dL    Triglycerides 67 0 - 149 mg/dL    HDL Cholesterol 64 >39 mg/dL    VLDL Cholesterol Herman 13 5 - 40 mg/dL    LDL Chol Calc ([facility]) 99 0 - 99 mg/dL   TSH    Specimen: Blood   Result Value Ref Range    TSH 2.100 0.450 - 4.500 uIU/mL   CBC and Differential    Specimen: Blood   Result Value Ref Range    WBC 5.6 3.4 - 10.8 x10E3/uL    RBC 4.57 3.77 - 5.28 x10E6/uL    Hemoglobin 14.5 11.1 - 15.9 g/dL    Hematocrit 42.1 34.0 - 46.6 %    MCV 92 79 - 97 fL    MCH 31.7 26.6 - 33.0 pg    MCHC 34.4 31.5 - 35.7 g/dL    RDW 13.1 11.7 - 15.4 %    Platelets 307 150 - 450 x10E3/uL    Neutrophil Rel % 66 Not Estab. %    Lymphocyte Rel % 23 Not Estab. %    Monocyte Rel % 8 Not Estab. %    Eosinophil Rel % 2 Not Estab. %    Basophil Rel % 1 Not Estab. %    Neutrophils Absolute 3.6 1.4 - 7.0 x10E3/uL    Lymphocytes Absolute 1.3 0.7 - 3.1 x10E3/uL    Monocytes Absolute 0.5 0.1 - 0.9 x10E3/uL    Eosinophils Absolute 0.1 0.0 - 0.4 x10E3/uL    Basophils Absolute 0.1 0.0 - 0.2 x10E3/uL    Immature Granulocyte Rel % 0 Not Estab. %    Immature Grans Absolute 0.0 0.0 - 0.1 x10E3/uL             The following portions of the patient's history were reviewed and updated as appropriate: allergies, current medications, past family history, past medical history, past social history, past surgical history, and problem list.    Review of Systems    Constitutional: Negative for activity change, chills and fever.   Respiratory: Negative for cough.    Cardiovascular: Negative for chest pain.   Psychiatric/Behavioral: Negative for dysphoric mood.       Objective   Physical Exam  Constitutional:       General: She is not in acute distress.     Appearance: She is well-developed.   Pulmonary:      Effort: Pulmonary effort is normal.   Neurological:      Mental Status: She is alert and oriented to person, place, and time.   Psychiatric:         Behavior: Behavior normal.         Thought Content: Thought content normal.         The patient has read and signed the [facility] Controlled Substance Contract.  I will continue to see patient for regular follow up appointments.  They are well controlled on their medication.  CHIQUI has been reviewed by me and is updated every 3 months. The patient is aware of the potential for addiction and dependence.    Assessment/Plan   Diagnoses and all orders for this visit:    1. Acute reaction to stress (Primary)  -     ALPRAZolam (XANAX) 0.5 MG tablet; Take 1 tablet by mouth 2 (Two) Times a Day.  Dispense: 60 tablet; Refill: 2    2. Primary insomnia  -     zolpidem (AMBIEN) 10 MG tablet; Take 1 tablet by mouth At Night As Needed for Sleep.  Dispense: 30 tablet; Refill: 2    Spent  8   minutes with chart and interview and consent for this encounter given by the patient.  You have chosen to receive care through a telehealth visit.  Do you consent to use a video/audio connection for your medical care today? Yes

## 2022-06-02 VITALS — HEIGHT: 67 IN | BODY MASS INDEX: 23.34 KG/M2

## 2022-06-05 NOTE — PROGRESS NOTES
"Subjective   Willie Enciso is a 56 y.o. female.     CC: Video Visit for Medical Management    History of Present Illness     Chief Complaint:   Chief Complaint   Patient presents with   • STRESS     VIDEO VISIT / FOLLOW UP PER PT   • Hypothyroidism     MED REFILL DUE / NO LABS    • Hyperlipidemia   • Anxiety   • Depression       Willie Enciso 56 y.o. female who presents today for Medical Management of the below listed issues. She  has a problem list of   Patient Active Problem List   Diagnosis   • Hyperlipidemia   • Hypothyroidism   • Insomnia   • Major depressive disorder, recurrent episode, in full remission (HCC)   • Acute reaction to stress   • IFG (impaired fasting glucose)   .  Since the last visit, She has overall felt well.  she has been compliant with   Current Outpatient Medications:   •  ALPRAZolam (XANAX) 0.5 MG tablet, Take 1 tablet by mouth 2 (Two) Times a Day., Disp: 60 tablet, Rfl: 2  •  fenofibrate micronized (ANTARA) 130 MG capsule, Take 1 capsule by mouth Every Morning Before Breakfast., Disp: 30 capsule, Rfl: 5  •  FLUoxetine (PROzac) 20 MG capsule, Take 3 capsules by mouth Daily., Disp: 90 capsule, Rfl: 5  •  levothyroxine (Synthroid) 50 MCG tablet, Take 1 tablet by mouth Daily., Disp: 30 tablet, Rfl: 5  •  liothyronine (Cytomel) 5 MCG tablet, Take 1 tablet by mouth Daily., Disp: 30 tablet, Rfl: 5  •  zolpidem (AMBIEN) 10 MG tablet, Take 1 tablet by mouth At Night As Needed for Sleep., Disp: 30 tablet, Rfl: 2.  She denies medication side effects.    All of the other chronic condition(s) listed above are stable w/o issues.    Ht 170.2 cm (67\")   BMI 23.34 kg/m²     Results for orders placed or performed in visit on 11/02/21   T3, Free    Specimen: Blood   Result Value Ref Range    T3, Free 3.3 2.0 - 4.4 pg/mL   T4, Free    Specimen: Blood   Result Value Ref Range    Free T4 1.39 0.82 - 1.77 ng/dL   Comprehensive metabolic panel    Specimen: Blood   Result Value Ref Range    Glucose 106 (H) 65 - " 99 mg/dL    BUN 18 6 - 24 mg/dL    Creatinine 0.90 0.57 - 1.00 mg/dL    eGFR Non African Am 72 >59 mL/min/1.73    eGFR African Am 83 >59 mL/min/1.73    BUN/Creatinine Ratio 20 9 - 23    Sodium 143 134 - 144 mmol/L    Potassium 4.5 3.5 - 5.2 mmol/L    Chloride 106 96 - 106 mmol/L    Total CO2 25 20 - 29 mmol/L    Calcium 9.4 8.7 - 10.2 mg/dL    Total Protein 6.1 6.0 - 8.5 g/dL    Albumin 4.0 3.8 - 4.9 g/dL    Globulin 2.1 1.5 - 4.5 g/dL    A/G Ratio 1.9 1.2 - 2.2    Total Bilirubin 0.3 0.0 - 1.2 mg/dL    Alkaline Phosphatase 56 44 - 121 IU/L    AST (SGOT) 14 0 - 40 IU/L    ALT (SGPT) 9 0 - 32 IU/L   Lipid panel    Specimen: Blood   Result Value Ref Range    Total Cholesterol 176 100 - 199 mg/dL    Triglycerides 67 0 - 149 mg/dL    HDL Cholesterol 64 >39 mg/dL    VLDL Cholesterol Herman 13 5 - 40 mg/dL    LDL Chol Calc (NIH) 99 0 - 99 mg/dL   TSH    Specimen: Blood   Result Value Ref Range    TSH 2.100 0.450 - 4.500 uIU/mL   CBC and Differential    Specimen: Blood   Result Value Ref Range    WBC 5.6 3.4 - 10.8 x10E3/uL    RBC 4.57 3.77 - 5.28 x10E6/uL    Hemoglobin 14.5 11.1 - 15.9 g/dL    Hematocrit 42.1 34.0 - 46.6 %    MCV 92 79 - 97 fL    MCH 31.7 26.6 - 33.0 pg    MCHC 34.4 31.5 - 35.7 g/dL    RDW 13.1 11.7 - 15.4 %    Platelets 307 150 - 450 x10E3/uL    Neutrophil Rel % 66 Not Estab. %    Lymphocyte Rel % 23 Not Estab. %    Monocyte Rel % 8 Not Estab. %    Eosinophil Rel % 2 Not Estab. %    Basophil Rel % 1 Not Estab. %    Neutrophils Absolute 3.6 1.4 - 7.0 x10E3/uL    Lymphocytes Absolute 1.3 0.7 - 3.1 x10E3/uL    Monocytes Absolute 0.5 0.1 - 0.9 x10E3/uL    Eosinophils Absolute 0.1 0.0 - 0.4 x10E3/uL    Basophils Absolute 0.1 0.0 - 0.2 x10E3/uL    Immature Granulocyte Rel % 0 Not Estab. %    Immature Grans Absolute 0.0 0.0 - 0.1 x10E3/uL             The following portions of the patient's history were reviewed and updated as appropriate: allergies, current medications, past family history, past medical history,  past social history, past surgical history, and problem list.    Review of Systems   Constitutional: Negative for activity change, chills and fever.   Respiratory: Negative for cough.    Cardiovascular: Negative for chest pain.   Psychiatric/Behavioral: Negative for dysphoric mood.       Objective   Physical Exam  Constitutional:       General: She is not in acute distress.     Appearance: She is well-developed.   Pulmonary:      Effort: Pulmonary effort is normal.   Neurological:      Mental Status: She is alert and oriented to person, place, and time.   Psychiatric:         Behavior: Behavior normal.         Thought Content: Thought content normal.       The patient has read and signed the [facility] Controlled Substance Contract.  I will continue to see patient for regular follow up appointments.  They are well controlled on their medication.  CHIQUI has been reviewed by me and is updated every 3 months. The patient is aware of the potential for addiction and dependence.        Diagnoses and all orders for this visit:    1. Acute reaction to stress (Primary)  -     ALPRAZolam (XANAX) 0.5 MG tablet; Take 1 tablet by mouth 2 (Two) Times a Day.  Dispense: 60 tablet; Refill: 2    2. Acquired hypothyroidism  -     liothyronine (Cytomel) 5 MCG tablet; Take 1 tablet by mouth Daily.  Dispense: 30 tablet; Refill: 5  -     levothyroxine (Synthroid) 50 MCG tablet; Take 1 tablet by mouth Daily.  Dispense: 30 tablet; Refill: 5  -     TSH; Future  -     T3, Free; Future  -     T4, Free; Future    3. Major depressive disorder, recurrent episode, in full remission (HCC)  -     FLUoxetine (PROzac) 20 MG capsule; Take 3 capsules by mouth Daily.  Dispense: 90 capsule; Refill: 5    4. Primary insomnia  -     zolpidem (AMBIEN) 10 MG tablet; Take 1 tablet by mouth At Night As Needed for Sleep.  Dispense: 30 tablet; Refill: 2    5. Mixed hyperlipidemia  -     fenofibrate micronized (ANTARA) 130 MG capsule; Take 1 capsule by mouth  Every Morning Before Breakfast.  Dispense: 30 capsule; Refill: 5  -     Lipid panel; Future    6. IFG (impaired fasting glucose)  -     Hemoglobin A1c; Future    7. Annual physical exam  Comments:  for labsonly, don't bill  Orders:  -     Comprehensive metabolic panel; Future  -     Lipid panel; Future  -     CBC and Differential; Future    Spent  12   minutes with chart and interview and consent for this encounter given by the patient.  You have chosen to receive care through a telehealth visit.  Do you consent to use a video/audio connection for your medical care today? Yes

## 2022-06-06 ENCOUNTER — TELEMEDICINE (OUTPATIENT)
Dept: FAMILY MEDICINE CLINIC | Facility: CLINIC | Age: 57
End: 2022-06-06

## 2022-06-06 DIAGNOSIS — F33.42 MAJOR DEPRESSIVE DISORDER, RECURRENT EPISODE, IN FULL REMISSION: Chronic | ICD-10-CM

## 2022-06-06 DIAGNOSIS — Z00.00 ANNUAL PHYSICAL EXAM: ICD-10-CM

## 2022-06-06 DIAGNOSIS — F51.01 PRIMARY INSOMNIA: Chronic | ICD-10-CM

## 2022-06-06 DIAGNOSIS — F43.0 ACUTE REACTION TO STRESS: Primary | Chronic | ICD-10-CM

## 2022-06-06 DIAGNOSIS — E78.2 MIXED HYPERLIPIDEMIA: Chronic | ICD-10-CM

## 2022-06-06 DIAGNOSIS — R73.01 IFG (IMPAIRED FASTING GLUCOSE): ICD-10-CM

## 2022-06-06 DIAGNOSIS — E03.9 ACQUIRED HYPOTHYROIDISM: Chronic | ICD-10-CM

## 2022-06-06 PROCEDURE — 99214 OFFICE O/P EST MOD 30 MIN: CPT | Performed by: FAMILY MEDICINE

## 2022-06-06 RX ORDER — LEVOTHYROXINE SODIUM 0.05 MG/1
50 TABLET ORAL DAILY
Qty: 30 TABLET | Refills: 5 | Status: SHIPPED | OUTPATIENT
Start: 2022-06-06 | End: 2022-12-14 | Stop reason: SDUPTHER

## 2022-06-06 RX ORDER — ALPRAZOLAM 0.5 MG/1
0.5 TABLET ORAL 2 TIMES DAILY
Qty: 60 TABLET | Refills: 2 | Status: SHIPPED | OUTPATIENT
Start: 2022-06-06 | End: 2022-09-21 | Stop reason: SDUPTHER

## 2022-06-06 RX ORDER — FLUOXETINE HYDROCHLORIDE 20 MG/1
60 CAPSULE ORAL DAILY
Qty: 90 CAPSULE | Refills: 5 | Status: SHIPPED | OUTPATIENT
Start: 2022-06-06 | End: 2022-12-14 | Stop reason: SDUPTHER

## 2022-06-06 RX ORDER — ZOLPIDEM TARTRATE 10 MG/1
10 TABLET ORAL NIGHTLY PRN
Qty: 30 TABLET | Refills: 2 | Status: SHIPPED | OUTPATIENT
Start: 2022-06-06 | End: 2022-09-21 | Stop reason: SDUPTHER

## 2022-06-06 RX ORDER — FENOFIBRATE 130 MG/1
130 CAPSULE ORAL
Qty: 30 CAPSULE | Refills: 5 | Status: SHIPPED | OUTPATIENT
Start: 2022-06-06 | End: 2022-12-14 | Stop reason: SDUPTHER

## 2022-06-06 RX ORDER — LIOTHYRONINE SODIUM 5 UG/1
5 TABLET ORAL DAILY
Qty: 30 TABLET | Refills: 5 | Status: SHIPPED | OUTPATIENT
Start: 2022-06-06 | End: 2022-12-14 | Stop reason: SDUPTHER

## 2022-09-21 VITALS — HEIGHT: 67 IN | BODY MASS INDEX: 23.34 KG/M2

## 2022-09-21 NOTE — PROGRESS NOTES
"Subjective   Willie Enciso is a 56 y.o. female.     CC: Video Visit for Medical Management    History of Present Illness     Chief Complaint:   Chief Complaint   Patient presents with   • Insomnia     VIDEO VISIT /  MED REFILL  - MAMMOGRAM DUE  (2018)    • Anxiety       Willie Enciso 56 y.o. female who presents today for Medical Management of the below listed issues. She  has a problem list of   Patient Active Problem List   Diagnosis   • Hyperlipidemia   • Hypothyroidism   • Insomnia   • Major depressive disorder, recurrent episode, in full remission (HCC)   • Acute reaction to stress   • IFG (impaired fasting glucose)   .  Since the last visit, She has overall felt well.  she has been compliant with   Current Outpatient Medications:   •  ALPRAZolam (XANAX) 0.5 MG tablet, Take 1 tablet by mouth 2 (Two) Times a Day., Disp: 60 tablet, Rfl: 2  •  zolpidem (AMBIEN) 10 MG tablet, Take 1 tablet by mouth At Night As Needed for Sleep., Disp: 30 tablet, Rfl: 2  •  fenofibrate micronized (ANTARA) 130 MG capsule, Take 1 capsule by mouth Every Morning Before Breakfast., Disp: 30 capsule, Rfl: 5  •  FLUoxetine (PROzac) 20 MG capsule, Take 3 capsules by mouth Daily., Disp: 90 capsule, Rfl: 5  •  levothyroxine (Synthroid) 50 MCG tablet, Take 1 tablet by mouth Daily., Disp: 30 tablet, Rfl: 5  •  liothyronine (Cytomel) 5 MCG tablet, Take 1 tablet by mouth Daily., Disp: 30 tablet, Rfl: 5.  She denies medication side effects.    All of the other chronic condition(s) listed above are stable w/o issues.    Ht 170.2 cm (67\")   BMI 23.34 kg/m²     Results for orders placed or performed in visit on 11/02/21   T3, Free    Specimen: Blood   Result Value Ref Range    T3, Free 3.3 2.0 - 4.4 pg/mL   T4, Free    Specimen: Blood   Result Value Ref Range    Free T4 1.39 0.82 - 1.77 ng/dL   Comprehensive metabolic panel    Specimen: Blood   Result Value Ref Range    Glucose 106 (H) 65 - 99 mg/dL    BUN 18 6 - 24 mg/dL    Creatinine 0.90 0.57 - " 1.00 mg/dL    eGFR Non African Am 72 >59 mL/min/1.73    eGFR African Am 83 >59 mL/min/1.73    BUN/Creatinine Ratio 20 9 - 23    Sodium 143 134 - 144 mmol/L    Potassium 4.5 3.5 - 5.2 mmol/L    Chloride 106 96 - 106 mmol/L    Total CO2 25 20 - 29 mmol/L    Calcium 9.4 8.7 - 10.2 mg/dL    Total Protein 6.1 6.0 - 8.5 g/dL    Albumin 4.0 3.8 - 4.9 g/dL    Globulin 2.1 1.5 - 4.5 g/dL    A/G Ratio 1.9 1.2 - 2.2    Total Bilirubin 0.3 0.0 - 1.2 mg/dL    Alkaline Phosphatase 56 44 - 121 IU/L    AST (SGOT) 14 0 - 40 IU/L    ALT (SGPT) 9 0 - 32 IU/L   Lipid panel    Specimen: Blood   Result Value Ref Range    Total Cholesterol 176 100 - 199 mg/dL    Triglycerides 67 0 - 149 mg/dL    HDL Cholesterol 64 >39 mg/dL    VLDL Cholesterol Herman 13 5 - 40 mg/dL    LDL Chol Calc (NIH) 99 0 - 99 mg/dL   TSH    Specimen: Blood   Result Value Ref Range    TSH 2.100 0.450 - 4.500 uIU/mL   CBC and Differential    Specimen: Blood   Result Value Ref Range    WBC 5.6 3.4 - 10.8 x10E3/uL    RBC 4.57 3.77 - 5.28 x10E6/uL    Hemoglobin 14.5 11.1 - 15.9 g/dL    Hematocrit 42.1 34.0 - 46.6 %    MCV 92 79 - 97 fL    MCH 31.7 26.6 - 33.0 pg    MCHC 34.4 31.5 - 35.7 g/dL    RDW 13.1 11.7 - 15.4 %    Platelets 307 150 - 450 x10E3/uL    Neutrophil Rel % 66 Not Estab. %    Lymphocyte Rel % 23 Not Estab. %    Monocyte Rel % 8 Not Estab. %    Eosinophil Rel % 2 Not Estab. %    Basophil Rel % 1 Not Estab. %    Neutrophils Absolute 3.6 1.4 - 7.0 x10E3/uL    Lymphocytes Absolute 1.3 0.7 - 3.1 x10E3/uL    Monocytes Absolute 0.5 0.1 - 0.9 x10E3/uL    Eosinophils Absolute 0.1 0.0 - 0.4 x10E3/uL    Basophils Absolute 0.1 0.0 - 0.2 x10E3/uL    Immature Granulocyte Rel % 0 Not Estab. %    Immature Grans Absolute 0.0 0.0 - 0.1 x10E3/uL             The following portions of the patient's history were reviewed and updated as appropriate: allergies, current medications, past family history, past medical history, past social history, past surgical history, and problem  list.    Review of Systems   Constitutional: Negative for activity change, chills and fever.   Respiratory: Negative for cough.    Cardiovascular: Negative for chest pain.   Psychiatric/Behavioral: Negative for dysphoric mood.       Objective   Physical Exam  Constitutional:       General: She is not in acute distress.     Appearance: She is well-developed.   Pulmonary:      Effort: Pulmonary effort is normal.   Neurological:      Mental Status: She is alert and oriented to person, place, and time.   Psychiatric:         Behavior: Behavior normal.         Thought Content: Thought content normal.             Diagnoses and all orders for this visit:    1. Primary insomnia (Primary)  -     zolpidem (AMBIEN) 10 MG tablet; Take 1 tablet by mouth At Night As Needed for Sleep.  Dispense: 30 tablet; Refill: 2    2. Acute reaction to stress  -     ALPRAZolam (XANAX) 0.5 MG tablet; Take 1 tablet by mouth 2 (Two) Times a Day.  Dispense: 60 tablet; Refill: 2    Spent 10    minutes with chart and interview and consent for this encounter given by the patient.  You have chosen to receive care through a telehealth visit.  Do you consent to use a video/audio connection for your medical care today? Yes

## 2022-09-22 ENCOUNTER — TELEMEDICINE (OUTPATIENT)
Dept: FAMILY MEDICINE CLINIC | Facility: CLINIC | Age: 57
End: 2022-09-22

## 2022-09-22 DIAGNOSIS — F43.0 ACUTE REACTION TO STRESS: Chronic | ICD-10-CM

## 2022-09-22 DIAGNOSIS — F51.01 PRIMARY INSOMNIA: Primary | Chronic | ICD-10-CM

## 2022-09-22 PROCEDURE — 99214 OFFICE O/P EST MOD 30 MIN: CPT | Performed by: FAMILY MEDICINE

## 2022-09-22 RX ORDER — ALPRAZOLAM 0.5 MG/1
0.5 TABLET ORAL 2 TIMES DAILY
Qty: 60 TABLET | Refills: 2 | Status: SHIPPED | OUTPATIENT
Start: 2022-09-22 | End: 2022-12-14 | Stop reason: SDUPTHER

## 2022-09-22 RX ORDER — ZOLPIDEM TARTRATE 10 MG/1
10 TABLET ORAL NIGHTLY PRN
Qty: 30 TABLET | Refills: 2 | Status: SHIPPED | OUTPATIENT
Start: 2022-09-22 | End: 2022-12-14 | Stop reason: SDUPTHER

## 2022-12-13 RX ORDER — LEVOTHYROXINE SODIUM 0.05 MG/1
50 TABLET ORAL DAILY
Qty: 30 TABLET | Refills: 5 | Status: CANCELLED | OUTPATIENT
Start: 2022-12-13

## 2022-12-13 RX ORDER — FENOFIBRATE 130 MG/1
130 CAPSULE ORAL
Qty: 30 CAPSULE | Refills: 5 | Status: CANCELLED | OUTPATIENT
Start: 2022-12-13 | End: 2023-12-13

## 2022-12-13 RX ORDER — ALPRAZOLAM 0.5 MG/1
0.5 TABLET ORAL 2 TIMES DAILY
Qty: 60 TABLET | Refills: 2 | Status: CANCELLED | OUTPATIENT
Start: 2022-12-13

## 2022-12-13 RX ORDER — ZOLPIDEM TARTRATE 10 MG/1
10 TABLET ORAL NIGHTLY PRN
Qty: 30 TABLET | Refills: 2 | Status: CANCELLED | OUTPATIENT
Start: 2022-12-13

## 2022-12-13 RX ORDER — FLUOXETINE HYDROCHLORIDE 20 MG/1
60 CAPSULE ORAL DAILY
Qty: 90 CAPSULE | Refills: 5 | Status: CANCELLED | OUTPATIENT
Start: 2022-12-13

## 2022-12-13 RX ORDER — LIOTHYRONINE SODIUM 5 UG/1
5 TABLET ORAL DAILY
Qty: 30 TABLET | Refills: 5 | Status: CANCELLED | OUTPATIENT
Start: 2022-12-13

## 2022-12-13 NOTE — PROGRESS NOTES
Subjective   Willie Enciso is a 57 y.o. female.     History of Present Illness     Chief Complaint:   Chief Complaint   Patient presents with   • Hyperlipidemia     MED REFILL  / NO LABS    • Hypothyroidism     MAMMOGRAM DUE    • Anxiety   • Depression   • Insomnia       Willie Enciso 57 y.o. female who presents today for Medical Management of the below listed issues. She  has a problem list of   Patient Active Problem List   Diagnosis   • Hyperlipidemia   • Hypothyroidism   • Insomnia   • Major depressive disorder, recurrent episode, in full remission (HCC)   • Acute reaction to stress   • IFG (impaired fasting glucose)   .  Since the last visit, She has overall felt well.  she has been compliant with   Current Outpatient Medications:   •  ALPRAZolam (XANAX) 0.5 MG tablet, Take 1 tablet by mouth 2 (Two) Times a Day., Disp: 60 tablet, Rfl: 2  •  fenofibrate micronized (ANTARA) 130 MG capsule, Take 1 capsule by mouth Every Morning Before Breakfast., Disp: 30 capsule, Rfl: 5  •  FLUoxetine (PROzac) 20 MG capsule, Take 3 capsules by mouth Daily., Disp: 90 capsule, Rfl: 5  •  levothyroxine (Synthroid) 50 MCG tablet, Take 1 tablet by mouth Daily., Disp: 30 tablet, Rfl: 5  •  liothyronine (Cytomel) 5 MCG tablet, Take 1 tablet by mouth Daily., Disp: 30 tablet, Rfl: 5  •  zolpidem (AMBIEN) 10 MG tablet, Take 1 tablet by mouth At Night As Needed for Sleep., Disp: 30 tablet, Rfl: 2.  She denies medication side effects.    All of the other chronic condition(s) listed above are stable w/o issues.    There were no vitals taken for this visit.    Results for orders placed or performed in visit on 11/02/21   T3, Free    Specimen: Blood   Result Value Ref Range    T3, Free 3.3 2.0 - 4.4 pg/mL   T4, Free    Specimen: Blood   Result Value Ref Range    Free T4 1.39 0.82 - 1.77 ng/dL   Comprehensive metabolic panel    Specimen: Blood   Result Value Ref Range    Glucose 106 (H) 65 - 99 mg/dL    BUN 18 6 - 24 mg/dL    Creatinine 0.90  0.57 - 1.00 mg/dL    eGFR Non African Am 72 >59 mL/min/1.73    eGFR African Am 83 >59 mL/min/1.73    BUN/Creatinine Ratio 20 9 - 23    Sodium 143 134 - 144 mmol/L    Potassium 4.5 3.5 - 5.2 mmol/L    Chloride 106 96 - 106 mmol/L    Total CO2 25 20 - 29 mmol/L    Calcium 9.4 8.7 - 10.2 mg/dL    Total Protein 6.1 6.0 - 8.5 g/dL    Albumin 4.0 3.8 - 4.9 g/dL    Globulin 2.1 1.5 - 4.5 g/dL    A/G Ratio 1.9 1.2 - 2.2    Total Bilirubin 0.3 0.0 - 1.2 mg/dL    Alkaline Phosphatase 56 44 - 121 IU/L    AST (SGOT) 14 0 - 40 IU/L    ALT (SGPT) 9 0 - 32 IU/L   Lipid panel    Specimen: Blood   Result Value Ref Range    Total Cholesterol 176 100 - 199 mg/dL    Triglycerides 67 0 - 149 mg/dL    HDL Cholesterol 64 >39 mg/dL    VLDL Cholesterol Herman 13 5 - 40 mg/dL    LDL Chol Calc (NIH) 99 0 - 99 mg/dL   TSH    Specimen: Blood   Result Value Ref Range    TSH 2.100 0.450 - 4.500 uIU/mL   CBC and Differential    Specimen: Blood   Result Value Ref Range    WBC 5.6 3.4 - 10.8 x10E3/uL    RBC 4.57 3.77 - 5.28 x10E6/uL    Hemoglobin 14.5 11.1 - 15.9 g/dL    Hematocrit 42.1 34.0 - 46.6 %    MCV 92 79 - 97 fL    MCH 31.7 26.6 - 33.0 pg    MCHC 34.4 31.5 - 35.7 g/dL    RDW 13.1 11.7 - 15.4 %    Platelets 307 150 - 450 x10E3/uL    Neutrophil Rel % 66 Not Estab. %    Lymphocyte Rel % 23 Not Estab. %    Monocyte Rel % 8 Not Estab. %    Eosinophil Rel % 2 Not Estab. %    Basophil Rel % 1 Not Estab. %    Neutrophils Absolute 3.6 1.4 - 7.0 x10E3/uL    Lymphocytes Absolute 1.3 0.7 - 3.1 x10E3/uL    Monocytes Absolute 0.5 0.1 - 0.9 x10E3/uL    Eosinophils Absolute 0.1 0.0 - 0.4 x10E3/uL    Basophils Absolute 0.1 0.0 - 0.2 x10E3/uL    Immature Granulocyte Rel % 0 Not Estab. %    Immature Grans Absolute 0.0 0.0 - 0.1 x10E3/uL             The following portions of the patient's history were reviewed and updated as appropriate: allergies, current medications, past family history, past medical history, past social history, past surgical history, and  problem list.    Review of Systems   Constitutional: Negative for activity change, chills and fever.   Respiratory: Negative for cough.    Cardiovascular: Negative for chest pain.   Psychiatric/Behavioral: Negative for dysphoric mood.       Objective   Physical Exam  Constitutional:       General: She is not in acute distress.     Appearance: She is well-developed.   Cardiovascular:      Rate and Rhythm: Normal rate and regular rhythm.   Pulmonary:      Effort: Pulmonary effort is normal.      Breath sounds: Normal breath sounds.   Neurological:      Mental Status: She is alert and oriented to person, place, and time.   Psychiatric:         Behavior: Behavior normal.         Thought Content: Thought content normal.     Pt has labs ordered to complete.        Diagnoses and all orders for this visit:    1. Acquired hypothyroidism (Primary)    2. Major depressive disorder, recurrent episode, in full remission (HCC)    3. Primary insomnia    4. Acute reaction to stress    5. Mixed hyperlipidemia

## 2022-12-14 ENCOUNTER — OFFICE VISIT (OUTPATIENT)
Dept: FAMILY MEDICINE CLINIC | Facility: CLINIC | Age: 57
End: 2022-12-14

## 2022-12-14 VITALS
SYSTOLIC BLOOD PRESSURE: 104 MMHG | HEART RATE: 66 BPM | RESPIRATION RATE: 14 BRPM | OXYGEN SATURATION: 100 % | HEIGHT: 67 IN | WEIGHT: 154 LBS | BODY MASS INDEX: 24.17 KG/M2 | DIASTOLIC BLOOD PRESSURE: 66 MMHG | TEMPERATURE: 97.4 F

## 2022-12-14 DIAGNOSIS — E78.2 MIXED HYPERLIPIDEMIA: Chronic | ICD-10-CM

## 2022-12-14 DIAGNOSIS — F43.0 ACUTE REACTION TO STRESS: Chronic | ICD-10-CM

## 2022-12-14 DIAGNOSIS — E03.9 ACQUIRED HYPOTHYROIDISM: Chronic | ICD-10-CM

## 2022-12-14 DIAGNOSIS — F33.42 MAJOR DEPRESSIVE DISORDER, RECURRENT EPISODE, IN FULL REMISSION: Chronic | ICD-10-CM

## 2022-12-14 DIAGNOSIS — F51.01 PRIMARY INSOMNIA: Chronic | ICD-10-CM

## 2022-12-14 PROCEDURE — 99214 OFFICE O/P EST MOD 30 MIN: CPT | Performed by: FAMILY MEDICINE

## 2022-12-14 RX ORDER — ALPRAZOLAM 0.5 MG/1
0.5 TABLET ORAL 2 TIMES DAILY
Qty: 60 TABLET | Refills: 2 | Status: SHIPPED | OUTPATIENT
Start: 2022-12-14 | End: 2023-04-03 | Stop reason: SDUPTHER

## 2022-12-14 RX ORDER — ZOLPIDEM TARTRATE 10 MG/1
10 TABLET ORAL NIGHTLY PRN
Qty: 30 TABLET | Refills: 2 | Status: SHIPPED | OUTPATIENT
Start: 2022-12-14 | End: 2023-04-03 | Stop reason: SDUPTHER

## 2022-12-14 RX ORDER — FENOFIBRATE 130 MG/1
130 CAPSULE ORAL
Qty: 30 CAPSULE | Refills: 5 | Status: SHIPPED | OUTPATIENT
Start: 2022-12-14 | End: 2023-12-14

## 2022-12-14 RX ORDER — LEVOTHYROXINE SODIUM 0.05 MG/1
50 TABLET ORAL DAILY
Qty: 30 TABLET | Refills: 5 | Status: SHIPPED | OUTPATIENT
Start: 2022-12-14

## 2022-12-14 RX ORDER — FLUOXETINE HYDROCHLORIDE 20 MG/1
60 CAPSULE ORAL DAILY
Qty: 90 CAPSULE | Refills: 5 | Status: SHIPPED | OUTPATIENT
Start: 2022-12-14

## 2022-12-14 RX ORDER — LIOTHYRONINE SODIUM 5 UG/1
5 TABLET ORAL DAILY
Qty: 30 TABLET | Refills: 5 | Status: SHIPPED | OUTPATIENT
Start: 2022-12-14

## 2022-12-14 NOTE — PROGRESS NOTES
"Subjective   Willie Enciso is a 57 y.o. female.     History of Present Illness     Chief Complaint:   Chief Complaint   Patient presents with   • Hypertension     Med refill / no labs /  mammogram due    • Hypothyroidism   • Arthritis   • Anxiety   • Depression   • Insomnia              Willie Enciso 57 y.o. female who presents today for Medical Management of the below listed issues. She  has a problem list of   Patient Active Problem List   Diagnosis   • Hyperlipidemia   • Hypothyroidism   • Insomnia   • Major depressive disorder, recurrent episode, in full remission (HCC)   • Acute reaction to stress   • IFG (impaired fasting glucose)   .  Since the last visit, She has overall felt well.  she has been compliant with   Current Outpatient Medications:   •  ALPRAZolam (XANAX) 0.5 MG tablet, Take 1 tablet by mouth 2 (Two) Times a Day., Disp: 60 tablet, Rfl: 2  •  fenofibrate micronized (ANTARA) 130 MG capsule, Take 1 capsule by mouth Every Morning Before Breakfast., Disp: 30 capsule, Rfl: 5  •  FLUoxetine (PROzac) 20 MG capsule, Take 3 capsules by mouth Daily., Disp: 90 capsule, Rfl: 5  •  levothyroxine (Synthroid) 50 MCG tablet, Take 1 tablet by mouth Daily., Disp: 30 tablet, Rfl: 5  •  liothyronine (Cytomel) 5 MCG tablet, Take 1 tablet by mouth Daily., Disp: 30 tablet, Rfl: 5  •  zolpidem (AMBIEN) 10 MG tablet, Take 1 tablet by mouth At Night As Needed for Sleep., Disp: 30 tablet, Rfl: 2.  She denies medication side effects.    All of the other chronic condition(s) listed above are stable w/o issues.    /66   Pulse 66   Temp 97.4 °F (36.3 °C) (Oral)   Resp 14   Ht 170.2 cm (67\")   Wt 69.9 kg (154 lb)   SpO2 100%   BMI 24.12 kg/m²     Results for orders placed or performed in visit on 11/02/21   T3, Free    Specimen: Blood   Result Value Ref Range    T3, Free 3.3 2.0 - 4.4 pg/mL   T4, Free    Specimen: Blood   Result Value Ref Range    Free T4 1.39 0.82 - 1.77 ng/dL   Comprehensive metabolic panel    " Specimen: Blood   Result Value Ref Range    Glucose 106 (H) 65 - 99 mg/dL    BUN 18 6 - 24 mg/dL    Creatinine 0.90 0.57 - 1.00 mg/dL    eGFR Non African Am 72 >59 mL/min/1.73    eGFR African Am 83 >59 mL/min/1.73    BUN/Creatinine Ratio 20 9 - 23    Sodium 143 134 - 144 mmol/L    Potassium 4.5 3.5 - 5.2 mmol/L    Chloride 106 96 - 106 mmol/L    Total CO2 25 20 - 29 mmol/L    Calcium 9.4 8.7 - 10.2 mg/dL    Total Protein 6.1 6.0 - 8.5 g/dL    Albumin 4.0 3.8 - 4.9 g/dL    Globulin 2.1 1.5 - 4.5 g/dL    A/G Ratio 1.9 1.2 - 2.2    Total Bilirubin 0.3 0.0 - 1.2 mg/dL    Alkaline Phosphatase 56 44 - 121 IU/L    AST (SGOT) 14 0 - 40 IU/L    ALT (SGPT) 9 0 - 32 IU/L   Lipid panel    Specimen: Blood   Result Value Ref Range    Total Cholesterol 176 100 - 199 mg/dL    Triglycerides 67 0 - 149 mg/dL    HDL Cholesterol 64 >39 mg/dL    VLDL Cholesterol Herman 13 5 - 40 mg/dL    LDL Chol Calc (NIH) 99 0 - 99 mg/dL   TSH    Specimen: Blood   Result Value Ref Range    TSH 2.100 0.450 - 4.500 uIU/mL   CBC and Differential    Specimen: Blood   Result Value Ref Range    WBC 5.6 3.4 - 10.8 x10E3/uL    RBC 4.57 3.77 - 5.28 x10E6/uL    Hemoglobin 14.5 11.1 - 15.9 g/dL    Hematocrit 42.1 34.0 - 46.6 %    MCV 92 79 - 97 fL    MCH 31.7 26.6 - 33.0 pg    MCHC 34.4 31.5 - 35.7 g/dL    RDW 13.1 11.7 - 15.4 %    Platelets 307 150 - 450 x10E3/uL    Neutrophil Rel % 66 Not Estab. %    Lymphocyte Rel % 23 Not Estab. %    Monocyte Rel % 8 Not Estab. %    Eosinophil Rel % 2 Not Estab. %    Basophil Rel % 1 Not Estab. %    Neutrophils Absolute 3.6 1.4 - 7.0 x10E3/uL    Lymphocytes Absolute 1.3 0.7 - 3.1 x10E3/uL    Monocytes Absolute 0.5 0.1 - 0.9 x10E3/uL    Eosinophils Absolute 0.1 0.0 - 0.4 x10E3/uL    Basophils Absolute 0.1 0.0 - 0.2 x10E3/uL    Immature Granulocyte Rel % 0 Not Estab. %    Immature Grans Absolute 0.0 0.0 - 0.1 x10E3/uL             The following portions of the patient's history were reviewed and updated as appropriate: allergies,  current medications, past family history, past medical history, past social history, past surgical history, and problem list.    Review of Systems   Constitutional: Negative for activity change, chills and fever.   Respiratory: Negative for cough.    Cardiovascular: Negative for chest pain.   Psychiatric/Behavioral: Negative for dysphoric mood.       Objective   Physical Exam  Constitutional:       General: She is not in acute distress.     Appearance: She is well-developed.   Cardiovascular:      Rate and Rhythm: Normal rate and regular rhythm.   Pulmonary:      Effort: Pulmonary effort is normal.      Breath sounds: Normal breath sounds.   Neurological:      Mental Status: She is alert and oriented to person, place, and time.   Psychiatric:         Behavior: Behavior normal.         Thought Content: Thought content normal.             Diagnoses and all orders for this visit:    1. Acute reaction to stress  -     ALPRAZolam (XANAX) 0.5 MG tablet; Take 1 tablet by mouth 2 (Two) Times a Day.  Dispense: 60 tablet; Refill: 2    2. Mixed hyperlipidemia  -     fenofibrate micronized (ANTARA) 130 MG capsule; Take 1 capsule by mouth Every Morning Before Breakfast.  Dispense: 30 capsule; Refill: 5    3. Primary insomnia  -     zolpidem (AMBIEN) 10 MG tablet; Take 1 tablet by mouth At Night As Needed for Sleep.  Dispense: 30 tablet; Refill: 2    4. Major depressive disorder, recurrent episode, in full remission (HCC)  -     FLUoxetine (PROzac) 20 MG capsule; Take 3 capsules by mouth Daily.  Dispense: 90 capsule; Refill: 5    5. Acquired hypothyroidism  -     levothyroxine (Synthroid) 50 MCG tablet; Take 1 tablet by mouth Daily.  Dispense: 30 tablet; Refill: 5  -     liothyronine (Cytomel) 5 MCG tablet; Take 1 tablet by mouth Daily.  Dispense: 30 tablet; Refill: 5

## 2023-02-23 ENCOUNTER — PRIOR AUTHORIZATION (OUTPATIENT)
Dept: FAMILY MEDICINE CLINIC | Facility: CLINIC | Age: 58
End: 2023-02-23
Payer: COMMERCIAL

## 2023-02-23 DIAGNOSIS — E78.2 MIXED HYPERLIPIDEMIA: Chronic | ICD-10-CM

## 2023-02-23 RX ORDER — FENOFIBRATE 130 MG/1
130 CAPSULE ORAL
Qty: 30 CAPSULE | Refills: 5 | Status: CANCELLED | OUTPATIENT
Start: 2023-02-23 | End: 2024-02-23

## 2023-02-23 NOTE — TELEPHONE ENCOUNTER
Behavioral incident    Pt was observed bullying another pt in front of other group members. To ensure a safe milieu, pt will take a therapy break day on 8/30. Writer met with pt who did NOT take responsibility for her actions and blamed the other pt when pt clearly was observed being the instigator. Pt also was defiant and refused to leave the group when asked by unit staff to take a break. Pt was informed she will come to her family mgt which is scheduled for 8/30 @ noon at which time her appropriateness in the adolescent PHP program will be addressed.     Writer called pt's mom and instructed her NOT to send pt to program but bring her in for only the family mgt. Message left. Await reply.     Behavioral incident #2  Pt was observed by other staff stating she was going to be suspended tomorrow and that its not her fault if she kills herself tonight. Because of pt's age and immaturity level, pt was  from group she could have time to herself to regulate her emotions and not have an opportunity to make inappropriate comments because she is being held accountable for her actions. Pt sat quietly in this therapist's office. Pt was offered fidgets and kinetic sand, however declined both. Pt and therapist created a new safety plan.     Writer called pt's mom and instructed her to review pt's safety plan with her. Mom was also instructed to do frequent checks on her, call the crisis unit if necessary, call 911 or take the pt to the ER if pt is in imminent risk of harming herself and/or others.    PA for Fenofibrate request has been approved. The authorization is effective for a maximum of 12 fills from 02/23/2023 to 02/22/2024, as long as the member is enrolled in their current health plan. The request was approved as submitted. A written notification letter will follow with additional details.

## 2023-04-03 NOTE — PROGRESS NOTES
"Subjective   Willie Enciso is a 57 y.o. female.     History of Present Illness     Chief Complaint:   Chief Complaint   Patient presents with   • Insomnia     Mammogram due    • Anxiety       Willie Enciso 57 y.o. female who presents today for Medical Management of the below listed issues. She  has a problem list of   Patient Active Problem List   Diagnosis   • Hyperlipidemia   • Hypothyroidism   • Insomnia   • Major depressive disorder, recurrent episode, in full remission   • Acute reaction to stress   • IFG (impaired fasting glucose)   .  Since the last visit, She has overall felt well.  she has been compliant with   Current Outpatient Medications:   •  ALPRAZolam (XANAX) 0.5 MG tablet, Take 1 tablet by mouth 2 (Two) Times a Day., Disp: 60 tablet, Rfl: 2  •  zolpidem (AMBIEN) 10 MG tablet, Take 1 tablet by mouth At Night As Needed for Sleep., Disp: 30 tablet, Rfl: 2  •  fenofibrate micronized (ANTARA) 130 MG capsule, Take 1 capsule by mouth Every Morning Before Breakfast., Disp: 30 capsule, Rfl: 5  •  FLUoxetine (PROzac) 20 MG capsule, Take 3 capsules by mouth Daily., Disp: 90 capsule, Rfl: 5  •  levothyroxine (Synthroid) 50 MCG tablet, Take 1 tablet by mouth Daily., Disp: 30 tablet, Rfl: 5  •  liothyronine (Cytomel) 5 MCG tablet, Take 1 tablet by mouth Daily., Disp: 30 tablet, Rfl: 5.  She denies medication side effects.    All of the other chronic condition(s) listed above are stable w/o issues.    /62   Pulse 68   Temp 97.4 °F (36.3 °C) (Oral)   Resp 16   Ht 170.2 cm (67\")   Wt 70.8 kg (156 lb)   BMI 24.43 kg/m²     Results for orders placed or performed in visit on 11/06/22   Comprehensive metabolic panel    Specimen: Blood   Result Value Ref Range    Glucose 105 (H) 65 - 99 mg/dL    BUN 12 6 - 20 mg/dL    Creatinine 0.77 0.57 - 1.00 mg/dL    EGFR Result 90.1 >60.0 mL/min/1.73    BUN/Creatinine Ratio 15.6 7.0 - 25.0    Sodium 139 136 - 145 mmol/L    Potassium 4.7 3.5 - 5.2 mmol/L    Chloride 102 " 98 - 107 mmol/L    Total CO2 28.3 22.0 - 29.0 mmol/L    Calcium 9.2 8.6 - 10.5 mg/dL    Total Protein 6.2 6.0 - 8.5 g/dL    Albumin 4.50 3.50 - 5.20 g/dL    Globulin 1.7 gm/dL    A/G Ratio 2.6 g/dL    Total Bilirubin 0.3 0.0 - 1.2 mg/dL    Alkaline Phosphatase 71 39 - 117 U/L    AST (SGOT) 15 1 - 32 U/L    ALT (SGPT) 14 1 - 33 U/L   Lipid panel    Specimen: Blood   Result Value Ref Range    Total Cholesterol 202 (H) 0 - 200 mg/dL    Triglycerides 82 0 - 150 mg/dL    HDL Cholesterol 58 40 - 60 mg/dL    VLDL Cholesterol Herman 15 5 - 40 mg/dL    LDL Chol Calc (NIH) 129 (H) 0 - 100 mg/dL   TSH    Specimen: Blood   Result Value Ref Range    TSH 3.060 0.270 - 4.200 uIU/mL   T3, Free    Specimen: Blood   Result Value Ref Range    T3, Free 1.9 (L) 2.0 - 4.4 pg/mL   T4, Free    Specimen: Blood   Result Value Ref Range    Free T4 1.14 0.93 - 1.70 ng/dL   Hemoglobin A1c    Specimen: Blood   Result Value Ref Range    Hemoglobin A1C 5.50 4.80 - 5.60 %   CBC and Differential    Specimen: Blood   Result Value Ref Range    WBC 5.78 3.40 - 10.80 10*3/mm3    RBC 4.61 3.77 - 5.28 10*6/mm3    Hemoglobin 14.0 12.0 - 15.9 g/dL    Hematocrit 41.3 34.0 - 46.6 %    MCV 89.6 79.0 - 97.0 fL    MCH 30.4 26.6 - 33.0 pg    MCHC 33.9 31.5 - 35.7 g/dL    RDW 12.4 12.3 - 15.4 %    Platelets 333 140 - 450 10*3/mm3    Neutrophil Rel % 71.3 42.7 - 76.0 %    Lymphocyte Rel % 17.1 (L) 19.6 - 45.3 %    Monocyte Rel % 7.3 5.0 - 12.0 %    Eosinophil Rel % 2.4 0.3 - 6.2 %    Basophil Rel % 1.4 0.0 - 1.5 %    Neutrophils Absolute 4.12 1.70 - 7.00 10*3/mm3    Lymphocytes Absolute 0.99 0.70 - 3.10 10*3/mm3    Monocytes Absolute 0.42 0.10 - 0.90 10*3/mm3    Eosinophils Absolute 0.14 0.00 - 0.40 10*3/mm3    Basophils Absolute 0.08 0.00 - 0.20 10*3/mm3    Immature Granulocyte Rel % 0.5 0.0 - 0.5 %    Immature Grans Absolute 0.03 0.00 - 0.05 10*3/mm3    nRBC 0.0 0.0 - 0.2 /100 WBC             The following portions of the patient's history were reviewed and updated  as appropriate: allergies, current medications, past family history, past medical history, past social history, past surgical history, and problem list.    Review of Systems   Constitutional: Negative for activity change, chills and fever.   Respiratory: Negative for cough.    Cardiovascular: Negative for chest pain.   Psychiatric/Behavioral: Negative for dysphoric mood.       Objective   Physical Exam  Constitutional:       General: She is not in acute distress.     Appearance: She is well-developed.   Cardiovascular:      Rate and Rhythm: Normal rate and regular rhythm.   Pulmonary:      Effort: Pulmonary effort is normal.      Breath sounds: Normal breath sounds.   Neurological:      Mental Status: She is alert and oriented to person, place, and time.   Psychiatric:         Behavior: Behavior normal.         Thought Content: Thought content normal.             Diagnoses and all orders for this visit:    1. Acute reaction to stress (Primary)  -     ALPRAZolam (XANAX) 0.5 MG tablet; Take 1 tablet by mouth 2 (Two) Times a Day.  Dispense: 60 tablet; Refill: 2    2. Primary insomnia  -     zolpidem (AMBIEN) 10 MG tablet; Take 1 tablet by mouth At Night As Needed for Sleep.  Dispense: 30 tablet; Refill: 2

## 2023-04-04 ENCOUNTER — OFFICE VISIT (OUTPATIENT)
Dept: FAMILY MEDICINE CLINIC | Facility: CLINIC | Age: 58
End: 2023-04-04
Payer: MEDICAID

## 2023-04-04 VITALS
SYSTOLIC BLOOD PRESSURE: 104 MMHG | WEIGHT: 156 LBS | BODY MASS INDEX: 24.48 KG/M2 | DIASTOLIC BLOOD PRESSURE: 62 MMHG | TEMPERATURE: 97.4 F | HEART RATE: 68 BPM | RESPIRATION RATE: 16 BRPM | HEIGHT: 67 IN

## 2023-04-04 DIAGNOSIS — F43.0 ACUTE REACTION TO STRESS: Primary | Chronic | ICD-10-CM

## 2023-04-04 DIAGNOSIS — F51.01 PRIMARY INSOMNIA: Chronic | ICD-10-CM

## 2023-04-04 PROCEDURE — 1159F MED LIST DOCD IN RCRD: CPT | Performed by: FAMILY MEDICINE

## 2023-04-04 PROCEDURE — 1160F RVW MEDS BY RX/DR IN RCRD: CPT | Performed by: FAMILY MEDICINE

## 2023-04-04 PROCEDURE — 99214 OFFICE O/P EST MOD 30 MIN: CPT | Performed by: FAMILY MEDICINE

## 2023-04-04 RX ORDER — ALPRAZOLAM 0.5 MG/1
0.5 TABLET ORAL 2 TIMES DAILY
Qty: 60 TABLET | Refills: 2 | Status: SHIPPED | OUTPATIENT
Start: 2023-04-04

## 2023-04-04 RX ORDER — ZOLPIDEM TARTRATE 10 MG/1
10 TABLET ORAL NIGHTLY PRN
Qty: 30 TABLET | Refills: 2 | Status: SHIPPED | OUTPATIENT
Start: 2023-04-04

## 2023-04-27 ENCOUNTER — PRIOR AUTHORIZATION (OUTPATIENT)
Dept: FAMILY MEDICINE CLINIC | Facility: CLINIC | Age: 58
End: 2023-04-27
Payer: MEDICAID

## 2023-08-05 NOTE — PROGRESS NOTES
"  Chief Complaint:   Chief Complaint   Patient presents with    Hyperlipidemia    Hypothyroidism    Anxiety    Depression    Insomnia       Willie Enciso 57 y.o. female who presents today for Medical Management of the below listed issues. She  has a problem list of   Patient Active Problem List   Diagnosis    Hyperlipidemia    Hypothyroidism    Insomnia    Major depressive disorder, recurrent episode, in full remission    Acute reaction to stress    IFG (impaired fasting glucose)   .  Since the last visit, She has overall felt well.  she has been compliant with   Current Outpatient Medications:     ALPRAZolam (XANAX) 0.5 MG tablet, Take 1 tablet by mouth 2 (Two) Times a Day., Disp: 60 tablet, Rfl: 2    fenofibrate micronized (ANTARA) 130 MG capsule, Take 1 capsule by mouth Every Morning Before Breakfast., Disp: 30 capsule, Rfl: 5    FLUoxetine (PROzac) 20 MG capsule, Take 3 capsules by mouth Daily., Disp: 90 capsule, Rfl: 5    levothyroxine (Synthroid) 50 MCG tablet, Take 1 tablet by mouth Daily., Disp: 30 tablet, Rfl: 5    liothyronine (Cytomel) 5 MCG tablet, Take 1 tablet by mouth Daily., Disp: 30 tablet, Rfl: 5    zolpidem (AMBIEN) 10 MG tablet, Take 1 tablet by mouth At Night As Needed for Sleep., Disp: 30 tablet, Rfl: 2.  She denies medication side effects.    All of the other chronic condition(s) listed above are stable w/o issues.    /71   Pulse 64   Temp 97.7 øF (36.5 øC) (Oral)   Resp 14   Ht 170.2 cm (67\")   Wt 69.9 kg (154 lb)   SpO2 95%   BMI 24.12 kg/mý     Results for orders placed or performed in visit on 11/06/22   Comprehensive metabolic panel    Specimen: Blood   Result Value Ref Range    Glucose 105 (H) 65 - 99 mg/dL    BUN 12 6 - 20 mg/dL    Creatinine 0.77 0.57 - 1.00 mg/dL    EGFR Result 90.1 >60.0 mL/min/1.73    BUN/Creatinine Ratio 15.6 7.0 - 25.0    Sodium 139 136 - 145 mmol/L    Potassium 4.7 3.5 - 5.2 mmol/L    Chloride 102 98 - 107 mmol/L    Total CO2 28.3 22.0 - 29.0 mmol/L "    Calcium 9.2 8.6 - 10.5 mg/dL    Total Protein 6.2 6.0 - 8.5 g/dL    Albumin 4.50 3.50 - 5.20 g/dL    Globulin 1.7 gm/dL    A/G Ratio 2.6 g/dL    Total Bilirubin 0.3 0.0 - 1.2 mg/dL    Alkaline Phosphatase 71 39 - 117 U/L    AST (SGOT) 15 1 - 32 U/L    ALT (SGPT) 14 1 - 33 U/L   Lipid panel    Specimen: Blood   Result Value Ref Range    Total Cholesterol 202 (H) 0 - 200 mg/dL    Triglycerides 82 0 - 150 mg/dL    HDL Cholesterol 58 40 - 60 mg/dL    VLDL Cholesterol Herman 15 5 - 40 mg/dL    LDL Chol Calc (NIH) 129 (H) 0 - 100 mg/dL   TSH    Specimen: Blood   Result Value Ref Range    TSH 3.060 0.270 - 4.200 uIU/mL   T3, Free    Specimen: Blood   Result Value Ref Range    T3, Free 1.9 (L) 2.0 - 4.4 pg/mL   T4, Free    Specimen: Blood   Result Value Ref Range    Free T4 1.14 0.93 - 1.70 ng/dL   Hemoglobin A1c    Specimen: Blood   Result Value Ref Range    Hemoglobin A1C 5.50 4.80 - 5.60 %   CBC and Differential    Specimen: Blood   Result Value Ref Range    WBC 5.78 3.40 - 10.80 10*3/mm3    RBC 4.61 3.77 - 5.28 10*6/mm3    Hemoglobin 14.0 12.0 - 15.9 g/dL    Hematocrit 41.3 34.0 - 46.6 %    MCV 89.6 79.0 - 97.0 fL    MCH 30.4 26.6 - 33.0 pg    MCHC 33.9 31.5 - 35.7 g/dL    RDW 12.4 12.3 - 15.4 %    Platelets 333 140 - 450 10*3/mm3    Neutrophil Rel % 71.3 42.7 - 76.0 %    Lymphocyte Rel % 17.1 (L) 19.6 - 45.3 %    Monocyte Rel % 7.3 5.0 - 12.0 %    Eosinophil Rel % 2.4 0.3 - 6.2 %    Basophil Rel % 1.4 0.0 - 1.5 %    Neutrophils Absolute 4.12 1.70 - 7.00 10*3/mm3    Lymphocytes Absolute 0.99 0.70 - 3.10 10*3/mm3    Monocytes Absolute 0.42 0.10 - 0.90 10*3/mm3    Eosinophils Absolute 0.14 0.00 - 0.40 10*3/mm3    Basophils Absolute 0.08 0.00 - 0.20 10*3/mm3    Immature Granulocyte Rel % 0.5 0.0 - 0.5 %    Immature Grans Absolute 0.03 0.00 - 0.05 10*3/mm3    nRBC 0.0 0.0 - 0.2 /100 WBC             The following portions of the patient's history were reviewed and updated as appropriate: allergies, current medications, past  family history, past medical history, past social history, past surgical history, and problem list.    Review of Systems   Constitutional:  Negative for activity change, chills and fever.   Respiratory:  Negative for cough.    Cardiovascular:  Negative for chest pain.   Psychiatric/Behavioral:  Negative for dysphoric mood.      Objective     BMI is within normal parameters. No other follow-up for BMI required.       Physical Exam  Constitutional:       General: She is not in acute distress.     Appearance: She is well-developed.   Cardiovascular:      Rate and Rhythm: Normal rate and regular rhythm.   Pulmonary:      Effort: Pulmonary effort is normal.      Breath sounds: Normal breath sounds.   Neurological:      Mental Status: She is alert and oriented to person, place, and time.   Psychiatric:         Behavior: Behavior normal.         Thought Content: Thought content normal.           Diagnoses and all orders for this visit:    1. Acute reaction to stress  -     ALPRAZolam (XANAX) 0.5 MG tablet; Take 1 tablet by mouth 2 (Two) Times a Day.  Dispense: 60 tablet; Refill: 2    2. Mixed hyperlipidemia  -     fenofibrate micronized (ANTARA) 130 MG capsule; Take 1 capsule by mouth Every Morning Before Breakfast.  Dispense: 30 capsule; Refill: 5    3. Major depressive disorder, recurrent episode, in full remission  -     FLUoxetine (PROzac) 20 MG capsule; Take 3 capsules by mouth Daily.  Dispense: 90 capsule; Refill: 5    4. Acquired hypothyroidism  -     levothyroxine (Synthroid) 50 MCG tablet; Take 1 tablet by mouth Daily.  Dispense: 30 tablet; Refill: 5  -     liothyronine (Cytomel) 5 MCG tablet; Take 1 tablet by mouth Daily.  Dispense: 30 tablet; Refill: 5    5. Primary insomnia  -     zolpidem (AMBIEN) 10 MG tablet; Take 1 tablet by mouth At Night As Needed for Sleep.  Dispense: 30 tablet; Refill: 2

## 2023-08-07 ENCOUNTER — OFFICE VISIT (OUTPATIENT)
Dept: FAMILY MEDICINE CLINIC | Facility: CLINIC | Age: 58
End: 2023-08-07
Payer: MEDICAID

## 2023-08-07 VITALS
OXYGEN SATURATION: 95 % | TEMPERATURE: 97.7 F | BODY MASS INDEX: 24.17 KG/M2 | HEART RATE: 64 BPM | SYSTOLIC BLOOD PRESSURE: 118 MMHG | WEIGHT: 154 LBS | RESPIRATION RATE: 14 BRPM | DIASTOLIC BLOOD PRESSURE: 71 MMHG | HEIGHT: 67 IN

## 2023-08-07 DIAGNOSIS — E78.2 MIXED HYPERLIPIDEMIA: Chronic | ICD-10-CM

## 2023-08-07 DIAGNOSIS — F43.0 ACUTE REACTION TO STRESS: Chronic | ICD-10-CM

## 2023-08-07 DIAGNOSIS — F51.01 PRIMARY INSOMNIA: Chronic | ICD-10-CM

## 2023-08-07 DIAGNOSIS — E03.9 ACQUIRED HYPOTHYROIDISM: Chronic | ICD-10-CM

## 2023-08-07 DIAGNOSIS — F33.42 MAJOR DEPRESSIVE DISORDER, RECURRENT EPISODE, IN FULL REMISSION: Chronic | ICD-10-CM

## 2023-08-07 PROCEDURE — 99214 OFFICE O/P EST MOD 30 MIN: CPT | Performed by: FAMILY MEDICINE

## 2023-08-07 PROCEDURE — 1160F RVW MEDS BY RX/DR IN RCRD: CPT | Performed by: FAMILY MEDICINE

## 2023-08-07 PROCEDURE — 1159F MED LIST DOCD IN RCRD: CPT | Performed by: FAMILY MEDICINE

## 2023-08-07 RX ORDER — ZOLPIDEM TARTRATE 10 MG/1
10 TABLET ORAL NIGHTLY PRN
Qty: 30 TABLET | Refills: 2 | Status: SHIPPED | OUTPATIENT
Start: 2023-08-07

## 2023-08-07 RX ORDER — FENOFIBRATE 130 MG/1
130 CAPSULE ORAL
Qty: 30 CAPSULE | Refills: 5 | Status: SHIPPED | OUTPATIENT
Start: 2023-08-07 | End: 2024-08-06

## 2023-08-07 RX ORDER — FLUOXETINE HYDROCHLORIDE 20 MG/1
60 CAPSULE ORAL DAILY
Qty: 90 CAPSULE | Refills: 5 | Status: SHIPPED | OUTPATIENT
Start: 2023-08-07

## 2023-08-07 RX ORDER — ALPRAZOLAM 0.5 MG/1
0.5 TABLET ORAL 2 TIMES DAILY
Qty: 60 TABLET | Refills: 2 | Status: SHIPPED | OUTPATIENT
Start: 2023-08-07

## 2023-08-07 RX ORDER — LEVOTHYROXINE SODIUM 0.05 MG/1
50 TABLET ORAL DAILY
Qty: 30 TABLET | Refills: 5 | Status: SHIPPED | OUTPATIENT
Start: 2023-08-07

## 2023-08-07 RX ORDER — LIOTHYRONINE SODIUM 5 UG/1
5 TABLET ORAL DAILY
Qty: 30 TABLET | Refills: 5 | Status: SHIPPED | OUTPATIENT
Start: 2023-08-07

## 2023-10-25 ENCOUNTER — OFFICE VISIT (OUTPATIENT)
Dept: FAMILY MEDICINE CLINIC | Facility: CLINIC | Age: 58
End: 2023-10-25
Payer: MEDICAID

## 2023-10-25 VITALS
BODY MASS INDEX: 24.64 KG/M2 | HEART RATE: 59 BPM | TEMPERATURE: 97.5 F | WEIGHT: 157 LBS | HEIGHT: 67 IN | RESPIRATION RATE: 14 BRPM | SYSTOLIC BLOOD PRESSURE: 130 MMHG | OXYGEN SATURATION: 98 % | DIASTOLIC BLOOD PRESSURE: 84 MMHG

## 2023-10-25 DIAGNOSIS — Z00.00 ANNUAL PHYSICAL EXAM: ICD-10-CM

## 2023-10-25 DIAGNOSIS — F43.0 ACUTE REACTION TO STRESS: Primary | Chronic | ICD-10-CM

## 2023-10-25 DIAGNOSIS — F51.01 PRIMARY INSOMNIA: Chronic | ICD-10-CM

## 2023-10-25 DIAGNOSIS — R73.01 IFG (IMPAIRED FASTING GLUCOSE): ICD-10-CM

## 2023-10-25 DIAGNOSIS — E78.2 MIXED HYPERLIPIDEMIA: ICD-10-CM

## 2023-10-25 DIAGNOSIS — E03.9 ACQUIRED HYPOTHYROIDISM: ICD-10-CM

## 2023-10-25 RX ORDER — ZOLPIDEM TARTRATE 10 MG/1
10 TABLET ORAL NIGHTLY PRN
Qty: 30 TABLET | Refills: 2 | Status: SHIPPED | OUTPATIENT
Start: 2023-10-25

## 2023-10-25 RX ORDER — ALPRAZOLAM 0.5 MG/1
0.5 TABLET ORAL 2 TIMES DAILY
Qty: 60 TABLET | Refills: 2 | Status: SHIPPED | OUTPATIENT
Start: 2023-10-25

## 2023-10-26 LAB
ALBUMIN SERPL-MCNC: 4.6 G/DL (ref 3.5–5.2)
ALBUMIN/GLOB SERPL: 2.2 G/DL
ALP SERPL-CCNC: 65 U/L (ref 39–117)
ALT SERPL-CCNC: 16 U/L (ref 1–33)
AST SERPL-CCNC: 17 U/L (ref 1–32)
BASOPHILS # BLD AUTO: 0.11 10*3/MM3 (ref 0–0.2)
BASOPHILS NFR BLD AUTO: 1.3 % (ref 0–1.5)
BILIRUB SERPL-MCNC: 0.2 MG/DL (ref 0–1.2)
BUN SERPL-MCNC: 9 MG/DL (ref 6–20)
BUN/CREAT SERPL: 10.8 (ref 7–25)
CALCIUM SERPL-MCNC: 9.8 MG/DL (ref 8.6–10.5)
CHLORIDE SERPL-SCNC: 105 MMOL/L (ref 98–107)
CHOLEST SERPL-MCNC: 208 MG/DL (ref 0–200)
CO2 SERPL-SCNC: 27 MMOL/L (ref 22–29)
CREAT SERPL-MCNC: 0.83 MG/DL (ref 0.57–1)
EGFRCR SERPLBLD CKD-EPI 2021: 81.8 ML/MIN/1.73
EOSINOPHIL # BLD AUTO: 0.33 10*3/MM3 (ref 0–0.4)
EOSINOPHIL NFR BLD AUTO: 4 % (ref 0.3–6.2)
ERYTHROCYTE [DISTWIDTH] IN BLOOD BY AUTOMATED COUNT: 13.1 % (ref 12.3–15.4)
GLOBULIN SER CALC-MCNC: 2.1 GM/DL
GLUCOSE SERPL-MCNC: 99 MG/DL (ref 65–99)
HBA1C MFR BLD: 5.2 % (ref 4.8–5.6)
HCT VFR BLD AUTO: 44.9 % (ref 34–46.6)
HDLC SERPL-MCNC: 63 MG/DL (ref 40–60)
HGB BLD-MCNC: 15 G/DL (ref 12–15.9)
IMM GRANULOCYTES # BLD AUTO: 0.04 10*3/MM3 (ref 0–0.05)
IMM GRANULOCYTES NFR BLD AUTO: 0.5 % (ref 0–0.5)
LDLC SERPL CALC-MCNC: 132 MG/DL (ref 0–100)
LYMPHOCYTES # BLD AUTO: 1.76 10*3/MM3 (ref 0.7–3.1)
LYMPHOCYTES NFR BLD AUTO: 21.5 % (ref 19.6–45.3)
MCH RBC QN AUTO: 29.9 PG (ref 26.6–33)
MCHC RBC AUTO-ENTMCNC: 33.4 G/DL (ref 31.5–35.7)
MCV RBC AUTO: 89.6 FL (ref 79–97)
MONOCYTES # BLD AUTO: 0.64 10*3/MM3 (ref 0.1–0.9)
MONOCYTES NFR BLD AUTO: 7.8 % (ref 5–12)
NEUTROPHILS # BLD AUTO: 5.3 10*3/MM3 (ref 1.7–7)
NEUTROPHILS NFR BLD AUTO: 64.9 % (ref 42.7–76)
NRBC BLD AUTO-RTO: 0 /100 WBC (ref 0–0.2)
PLATELET # BLD AUTO: 392 10*3/MM3 (ref 140–450)
POTASSIUM SERPL-SCNC: 4.6 MMOL/L (ref 3.5–5.2)
PROT SERPL-MCNC: 6.7 G/DL (ref 6–8.5)
RBC # BLD AUTO: 5.01 10*6/MM3 (ref 3.77–5.28)
SODIUM SERPL-SCNC: 139 MMOL/L (ref 136–145)
T4 FREE SERPL-MCNC: 1.35 NG/DL (ref 0.93–1.7)
TRIGL SERPL-MCNC: 71 MG/DL (ref 0–150)
TSH SERPL DL<=0.005 MIU/L-ACNC: 2.63 UIU/ML (ref 0.27–4.2)
VLDLC SERPL CALC-MCNC: 13 MG/DL (ref 5–40)
WBC # BLD AUTO: 8.18 10*3/MM3 (ref 3.4–10.8)

## 2023-11-07 ENCOUNTER — PRIOR AUTHORIZATION (OUTPATIENT)
Dept: FAMILY MEDICINE CLINIC | Facility: CLINIC | Age: 58
End: 2023-11-07
Payer: MEDICAID

## 2024-01-22 VITALS — BODY MASS INDEX: 24.59 KG/M2 | HEIGHT: 67 IN

## 2024-01-23 ENCOUNTER — TELEMEDICINE (OUTPATIENT)
Dept: FAMILY MEDICINE CLINIC | Facility: CLINIC | Age: 59
End: 2024-01-23
Payer: MEDICAID

## 2024-01-23 DIAGNOSIS — F51.01 PRIMARY INSOMNIA: Chronic | ICD-10-CM

## 2024-01-23 DIAGNOSIS — Z79.899 MEDICATION MANAGEMENT: ICD-10-CM

## 2024-01-23 DIAGNOSIS — E78.2 MIXED HYPERLIPIDEMIA: Chronic | ICD-10-CM

## 2024-01-23 DIAGNOSIS — E03.9 ACQUIRED HYPOTHYROIDISM: Primary | Chronic | ICD-10-CM

## 2024-01-23 DIAGNOSIS — F43.0 ACUTE REACTION TO STRESS: Chronic | ICD-10-CM

## 2024-01-23 DIAGNOSIS — F33.42 MAJOR DEPRESSIVE DISORDER, RECURRENT EPISODE, IN FULL REMISSION: Chronic | ICD-10-CM

## 2024-01-23 PROCEDURE — 1159F MED LIST DOCD IN RCRD: CPT | Performed by: FAMILY MEDICINE

## 2024-01-23 PROCEDURE — 99214 OFFICE O/P EST MOD 30 MIN: CPT | Performed by: FAMILY MEDICINE

## 2024-01-23 PROCEDURE — 1160F RVW MEDS BY RX/DR IN RCRD: CPT | Performed by: FAMILY MEDICINE

## 2024-01-23 RX ORDER — FENOFIBRATE 130 MG/1
130 CAPSULE ORAL
Qty: 30 CAPSULE | Refills: 5 | Status: SHIPPED | OUTPATIENT
Start: 2024-01-23 | End: 2025-01-22

## 2024-01-23 RX ORDER — ZOLPIDEM TARTRATE 10 MG/1
10 TABLET ORAL NIGHTLY PRN
Qty: 30 TABLET | Refills: 2 | Status: SHIPPED | OUTPATIENT
Start: 2024-01-23

## 2024-01-23 RX ORDER — ALPRAZOLAM 0.5 MG/1
0.5 TABLET ORAL 2 TIMES DAILY
Qty: 60 TABLET | Refills: 2 | Status: SHIPPED | OUTPATIENT
Start: 2024-01-23

## 2024-01-23 RX ORDER — LEVOTHYROXINE SODIUM 0.05 MG/1
50 TABLET ORAL DAILY
Qty: 30 TABLET | Refills: 5 | Status: SHIPPED | OUTPATIENT
Start: 2024-01-23

## 2024-01-23 RX ORDER — FLUOXETINE HYDROCHLORIDE 20 MG/1
60 CAPSULE ORAL DAILY
Qty: 90 CAPSULE | Refills: 5 | Status: SHIPPED | OUTPATIENT
Start: 2024-01-23

## 2024-01-23 RX ORDER — LIOTHYRONINE SODIUM 5 UG/1
5 TABLET ORAL DAILY
Qty: 30 TABLET | Refills: 5 | Status: SHIPPED | OUTPATIENT
Start: 2024-01-23

## 2024-01-23 NOTE — PROGRESS NOTES
"CC: Video Visit for Medical Management    Chief Complaint:   Chief Complaint   Patient presents with    Hypothyroidism    Anxiety    Depression    Insomnia     MED REFILL DUE         Willie Enciso 58 y.o. female who presents today for Medical Management of the below listed issues. She  has a problem list of   Patient Active Problem List   Diagnosis    Hyperlipidemia    Hypothyroidism    Insomnia    Major depressive disorder, recurrent episode, in full remission    Acute reaction to stress    IFG (impaired fasting glucose)   .  Since the last visit, She has overall felt well.  she has been compliant with   Current Outpatient Medications:     ALPRAZolam (XANAX) 0.5 MG tablet, Take 1 tablet by mouth 2 (Two) Times a Day., Disp: 60 tablet, Rfl: 2    fenofibrate micronized (ANTARA) 130 MG capsule, Take 1 capsule by mouth Every Morning Before Breakfast., Disp: 30 capsule, Rfl: 5    FLUoxetine (PROzac) 20 MG capsule, Take 3 capsules by mouth Daily., Disp: 90 capsule, Rfl: 5    levothyroxine (Synthroid) 50 MCG tablet, Take 1 tablet by mouth Daily., Disp: 30 tablet, Rfl: 5    liothyronine (Cytomel) 5 MCG tablet, Take 1 tablet by mouth Daily., Disp: 30 tablet, Rfl: 5    zolpidem (AMBIEN) 10 MG tablet, Take 1 tablet by mouth At Night As Needed for Sleep., Disp: 30 tablet, Rfl: 2.  She denies medication side effects.    All of the other chronic condition(s) listed above are stable w/o issues.    Ht 170.2 cm (67\")   BMI 24.59 kg/m²     Results for orders placed or performed in visit on 10/25/23   Comprehensive metabolic panel    Specimen: Blood   Result Value Ref Range    Glucose 99 65 - 99 mg/dL    BUN 9 6 - 20 mg/dL    Creatinine 0.83 0.57 - 1.00 mg/dL    EGFR Result 81.8 >60.0 mL/min/1.73    BUN/Creatinine Ratio 10.8 7.0 - 25.0    Sodium 139 136 - 145 mmol/L    Potassium 4.6 3.5 - 5.2 mmol/L    Chloride 105 98 - 107 mmol/L    Total CO2 27.0 22.0 - 29.0 mmol/L    Calcium 9.8 8.6 - 10.5 mg/dL    Total Protein 6.7 6.0 - 8.5 g/dL "    Albumin 4.6 3.5 - 5.2 g/dL    Globulin 2.1 gm/dL    A/G Ratio 2.2 g/dL    Total Bilirubin 0.2 0.0 - 1.2 mg/dL    Alkaline Phosphatase 65 39 - 117 U/L    AST (SGOT) 17 1 - 32 U/L    ALT (SGPT) 16 1 - 33 U/L   Lipid panel    Specimen: Blood   Result Value Ref Range    Total Cholesterol 208 (H) 0 - 200 mg/dL    Triglycerides 71 0 - 150 mg/dL    HDL Cholesterol 63 (H) 40 - 60 mg/dL    VLDL Cholesterol Herman 13 5 - 40 mg/dL    LDL Chol Calc (NIH) 132 (H) 0 - 100 mg/dL   TSH    Specimen: Blood   Result Value Ref Range    TSH 2.630 0.270 - 4.200 uIU/mL   T4, Free    Specimen: Blood   Result Value Ref Range    Free T4 1.35 0.93 - 1.70 ng/dL   Hemoglobin A1c    Specimen: Blood   Result Value Ref Range    Hemoglobin A1C 5.20 4.80 - 5.60 %   CBC and Differential    Specimen: Blood   Result Value Ref Range    WBC 8.18 3.40 - 10.80 10*3/mm3    RBC 5.01 3.77 - 5.28 10*6/mm3    Hemoglobin 15.0 12.0 - 15.9 g/dL    Hematocrit 44.9 34.0 - 46.6 %    MCV 89.6 79.0 - 97.0 fL    MCH 29.9 26.6 - 33.0 pg    MCHC 33.4 31.5 - 35.7 g/dL    RDW 13.1 12.3 - 15.4 %    Platelets 392 140 - 450 10*3/mm3    Neutrophil Rel % 64.9 42.7 - 76.0 %    Lymphocyte Rel % 21.5 19.6 - 45.3 %    Monocyte Rel % 7.8 5.0 - 12.0 %    Eosinophil Rel % 4.0 0.3 - 6.2 %    Basophil Rel % 1.3 0.0 - 1.5 %    Neutrophils Absolute 5.30 1.70 - 7.00 10*3/mm3    Lymphocytes Absolute 1.76 0.70 - 3.10 10*3/mm3    Monocytes Absolute 0.64 0.10 - 0.90 10*3/mm3    Eosinophils Absolute 0.33 0.00 - 0.40 10*3/mm3    Basophils Absolute 0.11 0.00 - 0.20 10*3/mm3    Immature Granulocyte Rel % 0.5 0.0 - 0.5 %    Immature Grans Absolute 0.04 0.00 - 0.05 10*3/mm3    nRBC 0.0 0.0 - 0.2 /100 WBC             The following portions of the patient's history were reviewed and updated as appropriate: allergies, current medications, past family history, past medical history, past social history, past surgical history, and problem list.    Review of Systems   Constitutional:  Negative for activity  change, chills and fever.   Respiratory:  Negative for cough.    Cardiovascular:  Negative for chest pain.   Psychiatric/Behavioral:  Negative for dysphoric mood.        Objective     BMI is within normal parameters. No other follow-up for BMI required.       Physical Exam  Constitutional:       General: She is not in acute distress.     Appearance: She is well-developed.   Pulmonary:      Effort: Pulmonary effort is normal.   Neurological:      Mental Status: She is alert and oriented to person, place, and time.   Psychiatric:         Behavior: Behavior normal.         Thought Content: Thought content normal.             Diagnoses and all orders for this visit:    1. Acquired hypothyroidism (Primary)  -     levothyroxine (Synthroid) 50 MCG tablet; Take 1 tablet by mouth Daily.  Dispense: 30 tablet; Refill: 5  -     liothyronine (Cytomel) 5 MCG tablet; Take 1 tablet by mouth Daily.  Dispense: 30 tablet; Refill: 5    2. Acute reaction to stress  -     ALPRAZolam (XANAX) 0.5 MG tablet; Take 1 tablet by mouth 2 (Two) Times a Day.  Dispense: 60 tablet; Refill: 2    3. Major depressive disorder, recurrent episode, in full remission  -     FLUoxetine (PROzac) 20 MG capsule; Take 3 capsules by mouth Daily.  Dispense: 90 capsule; Refill: 5    4. Primary insomnia  -     zolpidem (AMBIEN) 10 MG tablet; Take 1 tablet by mouth At Night As Needed for Sleep.  Dispense: 30 tablet; Refill: 2    5. Mixed hyperlipidemia  -     fenofibrate micronized (ANTARA) 130 MG capsule; Take 1 capsule by mouth Every Morning Before Breakfast.  Dispense: 30 capsule; Refill: 5    6. Medication management  -     Tox[employer]ure Flex 15, Ur -    Spent  12   minutes with chart and interview and consent for this encounter given by the patient.  You have chosen to receive care through a telehealth visit.  Do you consent to use a video/audio connection for your medical care today? Yes  Patient was in their residence when this visit took place and I was in my  medical office.

## 2024-02-06 LAB
1OH-MIDAZOLAM UR QL SCN: NOT DETECTED NG/MG CREAT
6MAM UR QL SCN: NEGATIVE NG/ML
7AMINOCLONAZEPAM/CREAT UR: NOT DETECTED NG/MG CREAT
A-OH ALPRAZ/CREAT UR: 304 NG/MG CREAT
A-OH-TRIAZOLAM/CREAT UR CFM: NOT DETECTED NG/MG CREAT
ALPRAZ/CREAT UR CFM: 262 NG/MG CREAT
AMPHETAMINES UR QL SCN: NEGATIVE NG/ML
BARBITURATES UR QL SCN: NEGATIVE NG/ML
BENZODIAZ SCN METH UR: NORMAL
BUPRENORPHINE UR QL SCN: NEGATIVE
BUPRENORPHINE/CREAT UR: NOT DETECTED NG/MG CREAT
CANNABINOIDS UR QL CFM: NORMAL
CANNABINOIDS UR QL SCN: NORMAL NG/ML
CARBOXYTHC UR CFM-MCNC: >1923 NG/MG CREAT
CLONAZEPAM/CREAT UR CFM: NOT DETECTED NG/MG CREAT
COCAINE+BZE UR QL SCN: NEGATIVE NG/ML
CREAT UR-MCNC: 52 MG/DL
DESALKYLFLURAZ/CREAT UR: NOT DETECTED NG/MG CREAT
DIAZEPAM/CREAT UR: NOT DETECTED NG/MG CREAT
ETHANOL UR QL SCN: NEGATIVE G/DL
FENTANYL CTO UR SCN-MCNC: NEGATIVE NG/ML
FENTANYL/CREAT UR: NOT DETECTED NG/MG CREAT
FLUNITRAZEPAM UR QL SCN: NOT DETECTED NG/MG CREAT
LORAZEPAM/CREAT UR: NOT DETECTED NG/MG CREAT
METHADONE UR QL SCN: NEGATIVE NG/ML
METHADONE+METAB UR QL SCN: NEGATIVE NG/ML
MIDAZOLAM/CREAT UR CFM: NOT DETECTED NG/MG CREAT
NORBUPRENORPHINE/CREAT UR: NOT DETECTED NG/MG CREAT
NORDIAZEPAM/CREAT UR: NOT DETECTED NG/MG CREAT
NORFENTANYL/CREAT UR: NOT DETECTED NG/MG CREAT
NORFLUNITRAZEPAM UR-MCNC: NOT DETECTED NG/MG CREAT
OPIATES UR SCN-MCNC: NEGATIVE NG/ML
OXAZEPAM/CREAT UR: NOT DETECTED NG/MG CREAT
OXYCODONE CTO UR SCN-MCNC: NEGATIVE NG/ML
PCP UR QL SCN: NEGATIVE NG/ML
PRESCRIBED MEDICATIONS: NORMAL
TAPENTADOL CTO UR SCN-MCNC: NEGATIVE NG/ML
TEMAZEPAM/CREAT UR: NOT DETECTED NG/MG CREAT
TRAMADOL UR QL SCN: NEGATIVE NG/ML

## 2024-04-25 ENCOUNTER — OFFICE VISIT (OUTPATIENT)
Dept: FAMILY MEDICINE CLINIC | Facility: CLINIC | Age: 59
End: 2024-04-25
Payer: MEDICAID

## 2024-04-25 VITALS
HEIGHT: 67 IN | BODY MASS INDEX: 24.48 KG/M2 | HEART RATE: 68 BPM | SYSTOLIC BLOOD PRESSURE: 128 MMHG | TEMPERATURE: 97.6 F | WEIGHT: 156 LBS | RESPIRATION RATE: 14 BRPM | DIASTOLIC BLOOD PRESSURE: 68 MMHG | OXYGEN SATURATION: 97 %

## 2024-04-25 DIAGNOSIS — F43.0 ACUTE REACTION TO STRESS: Chronic | ICD-10-CM

## 2024-04-25 DIAGNOSIS — F51.01 PRIMARY INSOMNIA: Primary | Chronic | ICD-10-CM

## 2024-04-25 PROCEDURE — 99214 OFFICE O/P EST MOD 30 MIN: CPT | Performed by: FAMILY MEDICINE

## 2024-04-25 PROCEDURE — 1159F MED LIST DOCD IN RCRD: CPT | Performed by: FAMILY MEDICINE

## 2024-04-25 PROCEDURE — 1160F RVW MEDS BY RX/DR IN RCRD: CPT | Performed by: FAMILY MEDICINE

## 2024-04-25 RX ORDER — ALPRAZOLAM 0.5 MG/1
0.5 TABLET ORAL 2 TIMES DAILY
Qty: 60 TABLET | Refills: 2 | Status: SHIPPED | OUTPATIENT
Start: 2024-04-25

## 2024-04-25 RX ORDER — ZOLPIDEM TARTRATE 10 MG/1
10 TABLET ORAL NIGHTLY PRN
Qty: 30 TABLET | Refills: 2 | Status: SHIPPED | OUTPATIENT
Start: 2024-04-25

## 2024-07-23 ENCOUNTER — TELEMEDICINE (OUTPATIENT)
Dept: FAMILY MEDICINE CLINIC | Facility: CLINIC | Age: 59
End: 2024-07-23
Payer: MEDICAID

## 2024-07-23 DIAGNOSIS — F43.0 ACUTE REACTION TO STRESS: Chronic | ICD-10-CM

## 2024-07-23 DIAGNOSIS — E78.2 MIXED HYPERLIPIDEMIA: Chronic | ICD-10-CM

## 2024-07-23 DIAGNOSIS — F33.42 MAJOR DEPRESSIVE DISORDER, RECURRENT EPISODE, IN FULL REMISSION: Chronic | ICD-10-CM

## 2024-07-23 DIAGNOSIS — E03.9 ACQUIRED HYPOTHYROIDISM: Chronic | ICD-10-CM

## 2024-07-23 DIAGNOSIS — F51.01 PRIMARY INSOMNIA: Chronic | ICD-10-CM

## 2024-07-23 PROCEDURE — 1160F RVW MEDS BY RX/DR IN RCRD: CPT | Performed by: FAMILY MEDICINE

## 2024-07-23 PROCEDURE — 1159F MED LIST DOCD IN RCRD: CPT | Performed by: FAMILY MEDICINE

## 2024-07-23 PROCEDURE — 99214 OFFICE O/P EST MOD 30 MIN: CPT | Performed by: FAMILY MEDICINE

## 2024-07-23 PROCEDURE — 1126F AMNT PAIN NOTED NONE PRSNT: CPT | Performed by: FAMILY MEDICINE

## 2024-07-23 RX ORDER — ZOLPIDEM TARTRATE 10 MG/1
10 TABLET ORAL NIGHTLY PRN
Qty: 30 TABLET | Refills: 2 | Status: SHIPPED | OUTPATIENT
Start: 2024-07-23

## 2024-07-23 RX ORDER — LEVOTHYROXINE SODIUM 0.05 MG/1
50 TABLET ORAL DAILY
Qty: 30 TABLET | Refills: 5 | Status: SHIPPED | OUTPATIENT
Start: 2024-07-23

## 2024-07-23 RX ORDER — FLUOXETINE HYDROCHLORIDE 20 MG/1
60 CAPSULE ORAL DAILY
Qty: 90 CAPSULE | Refills: 5 | Status: SHIPPED | OUTPATIENT
Start: 2024-07-23

## 2024-07-23 RX ORDER — FENOFIBRATE 130 MG/1
130 CAPSULE ORAL
Qty: 30 CAPSULE | Refills: 5 | Status: SHIPPED | OUTPATIENT
Start: 2024-07-23 | End: 2025-07-23

## 2024-07-23 RX ORDER — LIOTHYRONINE SODIUM 5 UG/1
5 TABLET ORAL DAILY
Qty: 30 TABLET | Refills: 5 | Status: SHIPPED | OUTPATIENT
Start: 2024-07-23

## 2024-07-23 RX ORDER — ALPRAZOLAM 0.5 MG/1
0.5 TABLET ORAL 2 TIMES DAILY
Qty: 60 TABLET | Refills: 2 | Status: SHIPPED | OUTPATIENT
Start: 2024-07-23

## 2024-07-23 NOTE — PROGRESS NOTES
CC: Video Visit for Medical Management    Chief Complaint:   Chief Complaint   Patient presents with    Hyperlipidemia    Hypothyroidism    Anxiety    Depression     MED REFILL   VIDEO VISIT        Willie Enciso 58 y.o. female who presents today for Medical Management of the below listed issues. She  has a problem list of   Patient Active Problem List   Diagnosis    Hyperlipidemia    Hypothyroidism    Insomnia    Major depressive disorder, recurrent episode, in full remission    Acute reaction to stress    IFG (impaired fasting glucose)   .  Since the last visit, She has overall felt well.  she has been compliant with   Current Outpatient Medications:     ALPRAZolam (XANAX) 0.5 MG tablet, Take 1 tablet by mouth 2 (Two) Times a Day., Disp: 60 tablet, Rfl: 2    fenofibrate micronized (ANTARA) 130 MG capsule, Take 1 capsule by mouth Every Morning Before Breakfast., Disp: 30 capsule, Rfl: 5    FLUoxetine (PROzac) 20 MG capsule, Take 3 capsules by mouth Daily., Disp: 90 capsule, Rfl: 5    levothyroxine (Synthroid) 50 MCG tablet, Take 1 tablet by mouth Daily., Disp: 30 tablet, Rfl: 5    liothyronine (Cytomel) 5 MCG tablet, Take 1 tablet by mouth Daily., Disp: 30 tablet, Rfl: 5    zolpidem (AMBIEN) 10 MG tablet, Take 1 tablet by mouth At Night As Needed for Sleep., Disp: 30 tablet, Rfl: 2.  She denies medication side effects.    All of the other chronic condition(s) listed above are stable w/o issues.    There were no vitals taken for this visit.    Results for orders placed or performed in visit on 01/23/24   ToxAssure Flex 15, Ur -    Specimen: Urine   Result Value Ref Range    Report Summary FINAL     CREATININE 52 mg/dL    Amphetamines, IA Negative CUTOFF:300 ng/mL    Benzodiazepines +POSITIVE+     Diazepam Urine, Qualitative Not Detected ng/mg creat    Desmethyldiazepam Not Detected ng/mg creat    Oxazepam, urine Not Detected ng/mg creat    Temazepam Not Detected ng/mg creat    Alprazolam Urine, Conf 262 ng/mg creat     Alpha-hydroxyalprazolam, Urine 304 ng/mg creat    Desalkylflurazepam, Urine Not Detected ng/mg creat    Lorazepam, Urine Not Detected ng/mg creat    Alpha-hydroxytriazolam, Urine Not Detected ng/mg creat    Clonazepam Not Detected ng/mg creat    7- AMINOCLONAZEPAM Not Detected ng/mg creat    Midazolam, Urine Not Detected ng/mg creat    Alpha-hydroxymidazolam, Urine Not Detected ng/mg creat    Flunitrazepam Not Detected ng/mg creat    DESMETHYLFLUNITRAZEPAM Not Detected ng/mg creat    COCAINE / METABOLITE, IA Negative CUTOFF:150 ng/mL    Ethyl Alcohol, Enz Negative CUTOFF:0.020 g/dL    Cannavinoids IA Comment CUTOFF:20 ng/mL    6-Acetylmorphine IA Negative CUTOFF:10 ng/mL    Opiate Class IA Negative CUTOFF:100 ng/mL    Oxycodone Class IA Negative CUTOFF:100 ng/mL    METHADONE, IA Negative CUTOFF:100 ng/mL    Methadone MTB IA Negative CUTOFF:100 ng/mL    Buprenorphine, Urine Negative     Buprenorphine, Urine Not Detected ng/mg creat    Norbuprenorphine Not Detected ng/mg creat    Fentanyl Negative     Fentanyl, Urine Not Detected ng/mg creat    Norfentanyl Urine Not Detected ng/mg creat    Tapentadol, IA Negative CUTOFF:200 ng/mL    TRAMADOL, IA Negative CUTOFF:200 ng/mL    Barbiturates, IA Negative CUTOFF:200 ng/mL    PHENCYCLIDINE, IA Negative CUTOFF:25 ng/mL   Cannabinoids, MS, Ur RFX -   Result Value Ref Range    Cannabinoid Confirmation +POSITIVE+     Carboxy THC >1923 ng/mg creat             The following portions of the patient's history were reviewed and updated as appropriate: allergies, current medications, past family history, past medical history, past social history, past surgical history, and problem list.    Review of Systems   Constitutional:  Negative for activity change, chills and fever.   Respiratory:  Negative for cough.    Cardiovascular:  Negative for chest pain.   Psychiatric/Behavioral:  Negative for dysphoric mood.        Objective     BMI is within normal parameters. No other follow-up  for BMI required.        Physical Exam  Constitutional:       General: She is not in acute distress.     Appearance: She is well-developed.   Pulmonary:      Effort: Pulmonary effort is normal.   Neurological:      Mental Status: She is alert and oriented to person, place, and time.   Psychiatric:         Behavior: Behavior normal.         Thought Content: Thought content normal.             Diagnoses and all orders for this visit:    1. Acute reaction to stress  -     ALPRAZolam (XANAX) 0.5 MG tablet; Take 1 tablet by mouth 2 (Two) Times a Day.  Dispense: 60 tablet; Refill: 2    2. Mixed hyperlipidemia  -     fenofibrate micronized (ANTARA) 130 MG capsule; Take 1 capsule by mouth Every Morning Before Breakfast.  Dispense: 30 capsule; Refill: 5    3. Primary insomnia  -     zolpidem (AMBIEN) 10 MG tablet; Take 1 tablet by mouth At Night As Needed for Sleep.  Dispense: 30 tablet; Refill: 2    4. Major depressive disorder, recurrent episode, in full remission  -     FLUoxetine (PROzac) 20 MG capsule; Take 3 capsules by mouth Daily.  Dispense: 90 capsule; Refill: 5    5. Acquired hypothyroidism  -     levothyroxine (Synthroid) 50 MCG tablet; Take 1 tablet by mouth Daily.  Dispense: 30 tablet; Refill: 5  -     liothyronine (Cytomel) 5 MCG tablet; Take 1 tablet by mouth Daily.  Dispense: 30 tablet; Refill: 5    Spent  12   minutes with chart and interview and consent for this encounter given by the patient.  You have chosen to receive care through a telehealth visit.  Do you consent to use a video/audio connection for your medical care today? Yes  Patient was in their residence when this visit took place and I was in my medical office.

## 2024-10-21 NOTE — PROGRESS NOTES
"  Chief Complaint:   Chief Complaint   Patient presents with    Anxiety    Insomnia     Med  refill due        Willie Enciso 59 y.o. female who presents today for Medical Management of the below listed issues. She  has a problem list of   Patient Active Problem List   Diagnosis    Hyperlipidemia    Hypothyroidism    Insomnia    Major depressive disorder, recurrent episode, in full remission    Acute reaction to stress    IFG (impaired fasting glucose)   .  Since the last visit, She has had worsening of her chronic anxiety issues.   she has been compliant with   Current Outpatient Medications:     ALPRAZolam (XANAX) 0.5 MG tablet, Take 1 tablet by mouth 2 (Two) Times a Day., Disp: 60 tablet, Rfl: 2    zolpidem (AMBIEN) 10 MG tablet, Take 1 tablet by mouth At Night As Needed for Sleep., Disp: 30 tablet, Rfl: 2    desvenlafaxine (Pristiq) 50 MG 24 hr tablet, Take 1 tablet by mouth Daily., Disp: 90 tablet, Rfl: 1    fenofibrate micronized (ANTARA) 130 MG capsule, Take 1 capsule by mouth Every Morning Before Breakfast., Disp: 30 capsule, Rfl: 5    levothyroxine (Synthroid) 50 MCG tablet, Take 1 tablet by mouth Daily., Disp: 30 tablet, Rfl: 5    liothyronine (Cytomel) 5 MCG tablet, Take 1 tablet by mouth Daily., Disp: 30 tablet, Rfl: 5.  She denies medication side effects.    All of the other chronic condition(s) listed above are stable w/o issues.    /82   Pulse 70   Temp 97.7 °F (36.5 °C) (Oral)   Resp 16   Ht 170.2 cm (67\")   Wt 71.7 kg (158 lb)   SpO2 99%   BMI 24.75 kg/m²     Results for orders placed or performed in visit on 01/23/24   ToxAssure Flex 15, Ur -    Collection Time: 02/02/24 11:51 AM    Specimen: Urine   Result Value Ref Range    Report Summary FINAL     CREATININE 52 mg/dL    Amphetamines, IA Negative CUTOFF:300 ng/mL    Benzodiazepines +POSITIVE+     Diazepam Urine, Qualitative Not Detected ng/mg creat    Desmethyldiazepam Not Detected ng/mg creat    Oxazepam, urine Not Detected ng/mg " creat    Temazepam Not Detected ng/mg creat    Alprazolam Urine, Conf 262 ng/mg creat    Alpha-hydroxyalprazolam, Urine 304 ng/mg creat    Desalkylflurazepam, Urine Not Detected ng/mg creat    Lorazepam, Urine Not Detected ng/mg creat    Alpha-hydroxytriazolam, Urine Not Detected ng/mg creat    Clonazepam Not Detected ng/mg creat    7- AMINOCLONAZEPAM Not Detected ng/mg creat    Midazolam, Urine Not Detected ng/mg creat    Alpha-hydroxymidazolam, Urine Not Detected ng/mg creat    Flunitrazepam Not Detected ng/mg creat    DESMETHYLFLUNITRAZEPAM Not Detected ng/mg creat    COCAINE / METABOLITE, IA Negative CUTOFF:150 ng/mL    Ethyl Alcohol, Enz Negative CUTOFF:0.020 g/dL    Cannavinoids IA Comment CUTOFF:20 ng/mL    6-Acetylmorphine IA Negative CUTOFF:10 ng/mL    Opiate Class IA Negative CUTOFF:100 ng/mL    Oxycodone Class IA Negative CUTOFF:100 ng/mL    METHADONE, IA Negative CUTOFF:100 ng/mL    Methadone MTB IA Negative CUTOFF:100 ng/mL    Buprenorphine, Urine Negative     Buprenorphine, Urine Not Detected ng/mg creat    Norbuprenorphine Not Detected ng/mg creat    Fentanyl Negative     Fentanyl, Urine Not Detected ng/mg creat    Norfentanyl Urine Not Detected ng/mg creat    Tapentadol, IA Negative CUTOFF:200 ng/mL    TRAMADOL, IA Negative CUTOFF:200 ng/mL    Barbiturates, IA Negative CUTOFF:200 ng/mL    PHENCYCLIDINE, IA Negative CUTOFF:25 ng/mL   Cannabinoids, MS, Ur RFX -    Collection Time: 02/02/24 11:51 AM   Result Value Ref Range    Cannabinoid Confirmation +POSITIVE+     Carboxy THC >1923 ng/mg creat             The following portions of the patient's history were reviewed and updated as appropriate: allergies, current medications, past family history, past medical history, past social history, past surgical history, and problem list.    Review of Systems   Constitutional:  Negative for activity change, chills and fever.   Respiratory:  Negative for cough.    Cardiovascular:  Negative for chest pain.    Psychiatric/Behavioral:  Negative for dysphoric mood. The patient is nervous/anxious.        Objective     BMI is within normal parameters. No other follow-up for BMI required.        Physical Exam  Vitals and nursing note reviewed.   Constitutional:       General: She is not in acute distress.     Appearance: She is well-developed.   Cardiovascular:      Rate and Rhythm: Normal rate and regular rhythm.   Pulmonary:      Effort: Pulmonary effort is normal.      Breath sounds: Normal breath sounds.   Neurological:      Mental Status: She is alert and oriented to person, place, and time.   Psychiatric:         Behavior: Behavior normal.         Thought Content: Thought content normal.             Diagnoses and all orders for this visit:    1. Anxiety (Primary)  Comments:  uncontrolled; medication changed  Orders:  -     ALPRAZolam (XANAX) 0.5 MG tablet; Take 1 tablet by mouth 2 (Two) Times a Day.  Dispense: 60 tablet; Refill: 2  -     desvenlafaxine (Pristiq) 50 MG 24 hr tablet; Take 1 tablet by mouth Daily.  Dispense: 90 tablet; Refill: 1    2. Primary insomnia  -     zolpidem (AMBIEN) 10 MG tablet; Take 1 tablet by mouth At Night As Needed for Sleep.  Dispense: 30 tablet; Refill: 2    3. Acquired hypothyroidism  -     TSH  -     T4, Free  -     T3    4. Mixed hyperlipidemia  -     Lipid Panel    5. IFG (impaired fasting glucose)  -     Comprehensive Metabolic Panel    Titration from Prozac discussed.

## 2024-10-22 ENCOUNTER — OFFICE VISIT (OUTPATIENT)
Dept: FAMILY MEDICINE CLINIC | Facility: CLINIC | Age: 59
End: 2024-10-22
Payer: MEDICAID

## 2024-10-22 VITALS
TEMPERATURE: 97.7 F | DIASTOLIC BLOOD PRESSURE: 82 MMHG | RESPIRATION RATE: 16 BRPM | SYSTOLIC BLOOD PRESSURE: 132 MMHG | BODY MASS INDEX: 24.8 KG/M2 | HEIGHT: 67 IN | HEART RATE: 70 BPM | OXYGEN SATURATION: 99 % | WEIGHT: 158 LBS

## 2024-10-22 DIAGNOSIS — F51.01 PRIMARY INSOMNIA: Chronic | ICD-10-CM

## 2024-10-22 DIAGNOSIS — E78.2 MIXED HYPERLIPIDEMIA: ICD-10-CM

## 2024-10-22 DIAGNOSIS — E03.9 ACQUIRED HYPOTHYROIDISM: ICD-10-CM

## 2024-10-22 DIAGNOSIS — F41.9 ANXIETY: Primary | ICD-10-CM

## 2024-10-22 DIAGNOSIS — R73.01 IFG (IMPAIRED FASTING GLUCOSE): ICD-10-CM

## 2024-10-22 PROCEDURE — 99214 OFFICE O/P EST MOD 30 MIN: CPT | Performed by: FAMILY MEDICINE

## 2024-10-22 PROCEDURE — 1159F MED LIST DOCD IN RCRD: CPT | Performed by: FAMILY MEDICINE

## 2024-10-22 PROCEDURE — 1125F AMNT PAIN NOTED PAIN PRSNT: CPT | Performed by: FAMILY MEDICINE

## 2024-10-22 PROCEDURE — 1160F RVW MEDS BY RX/DR IN RCRD: CPT | Performed by: FAMILY MEDICINE

## 2024-10-22 RX ORDER — ZOLPIDEM TARTRATE 10 MG/1
10 TABLET ORAL NIGHTLY PRN
Qty: 30 TABLET | Refills: 2 | Status: SHIPPED | OUTPATIENT
Start: 2024-10-22

## 2024-10-22 RX ORDER — ALPRAZOLAM 0.5 MG
0.5 TABLET ORAL 2 TIMES DAILY
Qty: 60 TABLET | Refills: 2 | Status: SHIPPED | OUTPATIENT
Start: 2024-10-22

## 2024-10-22 RX ORDER — DESVENLAFAXINE 50 MG/1
50 TABLET, FILM COATED, EXTENDED RELEASE ORAL DAILY
Qty: 90 TABLET | Refills: 1 | Status: SHIPPED | OUTPATIENT
Start: 2024-10-22

## 2024-10-23 LAB
ALBUMIN SERPL-MCNC: 4.1 G/DL (ref 3.5–5.2)
ALBUMIN/GLOB SERPL: 1.9 G/DL
ALP SERPL-CCNC: 62 U/L (ref 39–117)
ALT SERPL-CCNC: 14 U/L (ref 1–33)
AST SERPL-CCNC: 17 U/L (ref 1–32)
BILIRUB SERPL-MCNC: <0.2 MG/DL (ref 0–1.2)
BUN SERPL-MCNC: 8 MG/DL (ref 6–20)
BUN/CREAT SERPL: 10.7 (ref 7–25)
CALCIUM SERPL-MCNC: 9 MG/DL (ref 8.6–10.5)
CHLORIDE SERPL-SCNC: 104 MMOL/L (ref 98–107)
CHOLEST SERPL-MCNC: 220 MG/DL (ref 0–200)
CO2 SERPL-SCNC: 27 MMOL/L (ref 22–29)
CREAT SERPL-MCNC: 0.75 MG/DL (ref 0.57–1)
EGFRCR SERPLBLD CKD-EPI 2021: 91.8 ML/MIN/1.73
GLOBULIN SER CALC-MCNC: 2.2 GM/DL
GLUCOSE SERPL-MCNC: 91 MG/DL (ref 65–99)
HDLC SERPL-MCNC: 54 MG/DL (ref 40–60)
LDLC SERPL CALC-MCNC: 154 MG/DL (ref 0–100)
POTASSIUM SERPL-SCNC: 4.9 MMOL/L (ref 3.5–5.2)
PROT SERPL-MCNC: 6.3 G/DL (ref 6–8.5)
SODIUM SERPL-SCNC: 140 MMOL/L (ref 136–145)
T3 SERPL-MCNC: 95.1 NG/DL (ref 80–200)
T4 FREE SERPL-MCNC: 1.34 NG/DL (ref 0.92–1.68)
TRIGL SERPL-MCNC: 69 MG/DL (ref 0–150)
TSH SERPL DL<=0.005 MIU/L-ACNC: 3.19 UIU/ML (ref 0.27–4.2)
VLDLC SERPL CALC-MCNC: 12 MG/DL (ref 5–40)

## 2025-01-15 ENCOUNTER — TELEMEDICINE (OUTPATIENT)
Dept: FAMILY MEDICINE CLINIC | Facility: CLINIC | Age: 60
End: 2025-01-15
Payer: MEDICAID

## 2025-01-15 DIAGNOSIS — E03.9 ACQUIRED HYPOTHYROIDISM: Chronic | ICD-10-CM

## 2025-01-15 DIAGNOSIS — Z79.899 MEDICATION MANAGEMENT: ICD-10-CM

## 2025-01-15 DIAGNOSIS — F51.01 PRIMARY INSOMNIA: Chronic | ICD-10-CM

## 2025-01-15 DIAGNOSIS — E78.2 MIXED HYPERLIPIDEMIA: Chronic | ICD-10-CM

## 2025-01-15 DIAGNOSIS — F41.9 ANXIETY: Primary | Chronic | ICD-10-CM

## 2025-01-15 PROCEDURE — 1160F RVW MEDS BY RX/DR IN RCRD: CPT | Performed by: FAMILY MEDICINE

## 2025-01-15 PROCEDURE — 1125F AMNT PAIN NOTED PAIN PRSNT: CPT | Performed by: FAMILY MEDICINE

## 2025-01-15 PROCEDURE — 99214 OFFICE O/P EST MOD 30 MIN: CPT | Performed by: FAMILY MEDICINE

## 2025-01-15 PROCEDURE — 1159F MED LIST DOCD IN RCRD: CPT | Performed by: FAMILY MEDICINE

## 2025-01-15 RX ORDER — LEVOTHYROXINE SODIUM 50 UG/1
50 TABLET ORAL DAILY
Qty: 30 TABLET | Refills: 5 | Status: SHIPPED | OUTPATIENT
Start: 2025-01-15

## 2025-01-15 RX ORDER — ZOLPIDEM TARTRATE 10 MG/1
10 TABLET ORAL NIGHTLY PRN
Qty: 30 TABLET | Refills: 2 | Status: SHIPPED | OUTPATIENT
Start: 2025-01-15

## 2025-01-15 RX ORDER — FENOFIBRATE 130 MG/1
130 CAPSULE ORAL
Qty: 30 CAPSULE | Refills: 5 | Status: SHIPPED | OUTPATIENT
Start: 2025-01-15 | End: 2026-01-15

## 2025-01-15 RX ORDER — ALPRAZOLAM 0.5 MG
0.5 TABLET ORAL 2 TIMES DAILY
Qty: 60 TABLET | Refills: 2 | Status: SHIPPED | OUTPATIENT
Start: 2025-01-15

## 2025-01-15 RX ORDER — DESVENLAFAXINE 100 MG/1
100 TABLET, EXTENDED RELEASE ORAL DAILY
Qty: 30 TABLET | Refills: 5 | Status: SHIPPED | OUTPATIENT
Start: 2025-01-15

## 2025-01-15 RX ORDER — LIOTHYRONINE SODIUM 5 UG/1
5 TABLET ORAL DAILY
Qty: 30 TABLET | Refills: 5 | Status: SHIPPED | OUTPATIENT
Start: 2025-01-15

## 2025-01-15 NOTE — PROGRESS NOTES
CC: Video Visit for Medical Management    Chief Complaint:   Chief Complaint   Patient presents with    Hypothyroidism    Hyperlipidemia    Anxiety    Insomnia     MED REFILL / VIDEO VISIT   MAMMOGRAM / PAP SMEAR DUE   NO LABS        Willie Enciso 59 y.o. female who presents today for Medical Management of the below listed issues. She  has a problem list of   Patient Active Problem List   Diagnosis    Hyperlipidemia    Hypothyroidism    Insomnia    Major depressive disorder, recurrent episode, in full remission    Acute reaction to stress    IFG (impaired fasting glucose)   .  Since the last visit, She has overall felt well although reports still some underlying uncontrolled anxiety with the current dose of Pristiq.  she has been compliant with   Current Outpatient Medications:     ALPRAZolam (XANAX) 0.5 MG tablet, Take 1 tablet by mouth 2 (Two) Times a Day., Disp: 60 tablet, Rfl: 2    desvenlafaxine (Pristiq) 100 MG 24 hr tablet, Take 1 tablet by mouth Daily., Disp: 30 tablet, Rfl: 5    fenofibrate micronized (ANTARA) 130 MG capsule, Take 1 capsule by mouth Every Morning Before Breakfast., Disp: 30 capsule, Rfl: 5    levothyroxine (Synthroid) 50 MCG tablet, Take 1 tablet by mouth Daily., Disp: 30 tablet, Rfl: 5    liothyronine (Cytomel) 5 MCG tablet, Take 1 tablet by mouth Daily., Disp: 30 tablet, Rfl: 5    zolpidem (AMBIEN) 10 MG tablet, Take 1 tablet by mouth At Night As Needed for Sleep., Disp: 30 tablet, Rfl: 2.  She denies medication side effects.    All of the other chronic condition(s) listed above are stable w/o issues.    There were no vitals taken for this visit.    Results for orders placed or performed in visit on 10/22/24   TSH    Collection Time: 10/22/24 11:40 AM    Specimen: Blood   Result Value Ref Range    TSH 3.190 0.270 - 4.200 uIU/mL   T4, Free    Collection Time: 10/22/24 11:40 AM    Specimen: Blood   Result Value Ref Range    Free T4 1.34 0.92 - 1.68 ng/dL   T3    Collection Time: 10/22/24  11:40 AM    Specimen: Blood   Result Value Ref Range    T3, Total 95.1 80.0 - 200.0 ng/dl   Lipid Panel    Collection Time: 10/22/24 11:40 AM    Specimen: Blood   Result Value Ref Range    Total Cholesterol 220 (H) 0 - 200 mg/dL    Triglycerides 69 0 - 150 mg/dL    HDL Cholesterol 54 40 - 60 mg/dL    VLDL Cholesterol Herman 12 5 - 40 mg/dL    LDL Chol Calc (NIH) 154 (H) 0 - 100 mg/dL   Comprehensive Metabolic Panel    Collection Time: 10/22/24 11:40 AM    Specimen: Blood   Result Value Ref Range    Glucose 91 65 - 99 mg/dL    BUN 8 6 - 20 mg/dL    Creatinine 0.75 0.57 - 1.00 mg/dL    EGFR Result 91.8 >60.0 mL/min/1.73    BUN/Creatinine Ratio 10.7 7.0 - 25.0    Sodium 140 136 - 145 mmol/L    Potassium 4.9 3.5 - 5.2 mmol/L    Chloride 104 98 - 107 mmol/L    Total CO2 27.0 22.0 - 29.0 mmol/L    Calcium 9.0 8.6 - 10.5 mg/dL    Total Protein 6.3 6.0 - 8.5 g/dL    Albumin 4.1 3.5 - 5.2 g/dL    Globulin 2.2 gm/dL    A/G Ratio 1.9 g/dL    Total Bilirubin <0.2 0.0 - 1.2 mg/dL    Alkaline Phosphatase 62 39 - 117 U/L    AST (SGOT) 17 1 - 32 U/L    ALT (SGPT) 14 1 - 33 U/L             The following portions of the patient's history were reviewed and updated as appropriate: allergies, current medications, past family history, past medical history, past social history, past surgical history, and problem list.    Review of Systems   Constitutional:  Negative for activity change, chills and fever.   Respiratory:  Negative for cough.    Cardiovascular:  Negative for chest pain.   Psychiatric/Behavioral:  Negative for dysphoric mood. The patient is nervous/anxious.        Objective     BMI is within normal parameters. No other follow-up for BMI required.        Physical Exam  Constitutional:       General: She is not in acute distress.     Appearance: She is well-developed.   Pulmonary:      Effort: Pulmonary effort is normal.   Neurological:      Mental Status: She is alert and oriented to person, place, and time.   Psychiatric:          Behavior: Behavior normal.         Thought Content: Thought content normal.             Diagnoses and all orders for this visit:    1. Anxiety (Primary)  Comments:  uncontrolled; medication changed  Orders:  -     ALPRAZolam (XANAX) 0.5 MG tablet; Take 1 tablet by mouth 2 (Two) Times a Day.  Dispense: 60 tablet; Refill: 2  -     desvenlafaxine (Pristiq) 100 MG 24 hr tablet; Take 1 tablet by mouth Daily.  Dispense: 30 tablet; Refill: 5    2. Primary insomnia  -     zolpidem (AMBIEN) 10 MG tablet; Take 1 tablet by mouth At Night As Needed for Sleep.  Dispense: 30 tablet; Refill: 2    3. Acquired hypothyroidism  -     levothyroxine (Synthroid) 50 MCG tablet; Take 1 tablet by mouth Daily.  Dispense: 30 tablet; Refill: 5  -     liothyronine (Cytomel) 5 MCG tablet; Take 1 tablet by mouth Daily.  Dispense: 30 tablet; Refill: 5    4. Mixed hyperlipidemia  -     fenofibrate micronized (ANTARA) 130 MG capsule; Take 1 capsule by mouth Every Morning Before Breakfast.  Dispense: 30 capsule; Refill: 5    5. Medication management  -     ToxAssure Flex 15, Ur -    Spent  9   minutes with chart and interview and consent for this encounter given by the patient.  You have chosen to receive care through a telehealth visit.  Do you consent to use a video/audio connection for your medical care today? Yes  Patient was in their residence when this visit took place and I was in my medical office.

## 2025-02-13 LAB
1OH-MIDAZOLAM UR QL SCN: NOT DETECTED NG/MG CREAT
6MAM UR QL SCN: NEGATIVE NG/ML
7AMINOCLONAZEPAM/CREAT UR: NOT DETECTED NG/MG CREAT
A-OH ALPRAZ/CREAT UR: 318 NG/MG CREAT
A-OH-TRIAZOLAM/CREAT UR CFM: NOT DETECTED NG/MG CREAT
ALPRAZ/CREAT UR CFM: 169 NG/MG CREAT
AMPHETAMINES UR QL SCN: NEGATIVE NG/ML
BARBITURATES UR QL SCN: NEGATIVE NG/ML
BENZODIAZ SCN METH UR: NORMAL
BUPRENORPHINE UR QL SCN: NEGATIVE
BUPRENORPHINE/CREAT UR: NOT DETECTED NG/MG CREAT
CANNABINOIDS UR QL CFM: NORMAL
CANNABINOIDS UR QL SCN: NORMAL NG/ML
CARBOXYTHC UR CFM-MCNC: >1818 NG/MG CREAT
CLONAZEPAM/CREAT UR CFM: NOT DETECTED NG/MG CREAT
COCAINE+BZE UR QL SCN: NEGATIVE NG/ML
CREAT UR-MCNC: 55 MG/DL
DESALKYLFLURAZ/CREAT UR: NOT DETECTED NG/MG CREAT
DIAZEPAM/CREAT UR: NOT DETECTED NG/MG CREAT
ETHANOL UR QL SCN: NEGATIVE G/DL
FENTANYL CTO UR SCN-MCNC: NEGATIVE NG/ML
FENTANYL/CREAT UR: NOT DETECTED NG/MG CREAT
FLUNITRAZEPAM UR QL SCN: NOT DETECTED NG/MG CREAT
LORAZEPAM/CREAT UR: NOT DETECTED NG/MG CREAT
METHADONE UR QL SCN: NEGATIVE NG/ML
METHADONE+METAB UR QL SCN: NEGATIVE NG/ML
MIDAZOLAM/CREAT UR CFM: NOT DETECTED NG/MG CREAT
NORBUPRENORPHINE/CREAT UR: NOT DETECTED NG/MG CREAT
NORDIAZEPAM/CREAT UR: NOT DETECTED NG/MG CREAT
NORFENTANYL/CREAT UR: NOT DETECTED NG/MG CREAT
NORFLUNITRAZEPAM UR-MCNC: NOT DETECTED NG/MG CREAT
OPIATES UR SCN-MCNC: NEGATIVE NG/ML
OXAZEPAM/CREAT UR: NOT DETECTED NG/MG CREAT
OXYCODONE CTO UR SCN-MCNC: NEGATIVE NG/ML
PCP UR QL SCN: NEGATIVE NG/ML
PRESCRIBED MEDICATIONS: NORMAL
TAPENTADOL CTO UR SCN-MCNC: NEGATIVE NG/ML
TEMAZEPAM/CREAT UR: NOT DETECTED NG/MG CREAT
TRAMADOL UR QL SCN: NEGATIVE NG/ML

## 2025-04-12 NOTE — PROGRESS NOTES
"  Chief Complaint:   Chief Complaint   Patient presents with    Insomnia    Anxiety     To discuss xasofiya Enciso 59 y.o. female who presents today for Medical Management of the below listed issues. She  has a problem list of   Patient Active Problem List   Diagnosis    Hyperlipidemia    Hypothyroidism    Insomnia    Major depressive disorder, recurrent episode, in full remission    Acute reaction to stress    IFG (impaired fasting glucose)   .  Since the last visit, She has overall felt well.  she has been compliant with   Current Outpatient Medications:     ALPRAZolam (XANAX) 0.5 MG tablet, Take 1 tablet by mouth 2 (Two) Times a Day., Disp: 60 tablet, Rfl: 2    zolpidem (AMBIEN) 10 MG tablet, Take 1 tablet by mouth At Night As Needed for Sleep., Disp: 30 tablet, Rfl: 2    desvenlafaxine (Pristiq) 100 MG 24 hr tablet, Take 1 tablet by mouth Daily., Disp: 30 tablet, Rfl: 5    fenofibrate micronized (ANTARA) 130 MG capsule, Take 1 capsule by mouth Every Morning Before Breakfast., Disp: 30 capsule, Rfl: 5    levothyroxine (Synthroid) 50 MCG tablet, Take 1 tablet by mouth Daily., Disp: 30 tablet, Rfl: 5    liothyronine (Cytomel) 5 MCG tablet, Take 1 tablet by mouth Daily., Disp: 30 tablet, Rfl: 5.  She denies medication side effects.    All of the other chronic condition(s) listed above are stable w/o issues.    /72   Pulse 80   Temp 98.2 °F (36.8 °C) (Oral)   Resp 16   Ht 170.2 cm (67\")   Wt 72 kg (158 lb 12.8 oz)   SpO2 97%   BMI 24.87 kg/m²     Results for orders placed or performed in visit on 01/15/25   ToxAssure Flex 15, Ur -    Collection Time: 02/10/25 11:41 AM    Specimen: Urine   Result Value Ref Range    Report Summary FINAL     CREATININE 55 mg/dL    Amphetamines, IA Negative CUTOFF:300 ng/mL    Benzodiazepines +POSITIVE+     Diazepam Urine, Qualitative Not Detected ng/mg creat    Desmethyldiazepam Not Detected ng/mg creat    Oxazepam, urine Not Detected ng/mg creat    Temazepam Not " Detected ng/mg creat    Alprazolam Urine, Conf 169 ng/mg creat    Alpha-hydroxyalprazolam, Urine 318 ng/mg creat    Desalkylflurazepam, Urine Not Detected ng/mg creat    Lorazepam, Urine Not Detected ng/mg creat    Alpha-hydroxytriazolam, Urine Not Detected ng/mg creat    Clonazepam Not Detected ng/mg creat    7- AMINOCLONAZEPAM Not Detected ng/mg creat    Midazolam, Urine Not Detected ng/mg creat    Alpha-hydroxymidazolam, Urine Not Detected ng/mg creat    Flunitrazepam Not Detected ng/mg creat    DESMETHYLFLUNITRAZEPAM Not Detected ng/mg creat    COCAINE / METABOLITE, IA Negative CUTOFF:150 ng/mL    Ethyl Alcohol, Enz Negative CUTOFF:0.020 g/dL    Cannavinoids IA Comment CUTOFF:20 ng/mL    6-Acetylmorphine IA Negative CUTOFF:10 ng/mL    Opiate Class IA Negative CUTOFF:100 ng/mL    Oxycodone Class IA Negative CUTOFF:100 ng/mL    METHADONE, IA Negative CUTOFF:100 ng/mL    Methadone MTB IA Negative CUTOFF:100 ng/mL    Buprenorphine, Urine Negative     Buprenorphine, Urine Not Detected ng/mg creat    Norbuprenorphine Not Detected ng/mg creat    Fentanyl Negative     Fentanyl, Urine Not Detected ng/mg creat    Norfentanyl Urine Not Detected ng/mg creat    Tapentadol, IA Negative CUTOFF:200 ng/mL    TRAMADOL, IA Negative CUTOFF:200 ng/mL    Barbiturates, IA Negative CUTOFF:200 ng/mL    PHENCYCLIDINE, IA Negative CUTOFF:25 ng/mL   Cannabinoids, MS, Ur RFX -    Collection Time: 02/10/25 11:41 AM   Result Value Ref Range    Cannabinoid Confirmation +POSITIVE+     Carboxy THC >1818 ng/mg creat             The following portions of the patient's history were reviewed and updated as appropriate: allergies, current medications, past family history, past medical history, past social history, past surgical history, and problem list.    Review of Systems   Constitutional:  Negative for activity change, chills and fever.   Respiratory:  Negative for cough.    Cardiovascular:  Negative for chest pain.   Psychiatric/Behavioral:   Negative for dysphoric mood.        Objective     BMI is within normal parameters. No other follow-up for BMI required.        Physical Exam  Vitals and nursing note reviewed.   Constitutional:       General: She is not in acute distress.     Appearance: She is well-developed.   Cardiovascular:      Rate and Rhythm: Normal rate and regular rhythm.   Pulmonary:      Effort: Pulmonary effort is normal.      Breath sounds: Normal breath sounds.   Neurological:      Mental Status: She is alert and oriented to person, place, and time.   Psychiatric:         Behavior: Behavior normal.         Thought Content: Thought content normal.             Diagnoses and all orders for this visit:    1. Primary insomnia (Primary)  -     zolpidem (AMBIEN) 10 MG tablet; Take 1 tablet by mouth At Night As Needed for Sleep.  Dispense: 30 tablet; Refill: 2    2. Anxiety  -     ALPRAZolam (XANAX) 0.5 MG tablet; Take 1 tablet by mouth 2 (Two) Times a Day.  Dispense: 60 tablet; Refill: 2

## 2025-04-14 ENCOUNTER — OFFICE VISIT (OUTPATIENT)
Dept: FAMILY MEDICINE CLINIC | Facility: CLINIC | Age: 60
End: 2025-04-14
Payer: MEDICAID

## 2025-04-14 VITALS
WEIGHT: 158.8 LBS | HEART RATE: 80 BPM | SYSTOLIC BLOOD PRESSURE: 116 MMHG | RESPIRATION RATE: 16 BRPM | BODY MASS INDEX: 24.92 KG/M2 | HEIGHT: 67 IN | TEMPERATURE: 98.2 F | OXYGEN SATURATION: 97 % | DIASTOLIC BLOOD PRESSURE: 72 MMHG

## 2025-04-14 DIAGNOSIS — F41.9 ANXIETY: Chronic | ICD-10-CM

## 2025-04-14 DIAGNOSIS — F51.01 PRIMARY INSOMNIA: Primary | Chronic | ICD-10-CM

## 2025-04-14 PROCEDURE — 1160F RVW MEDS BY RX/DR IN RCRD: CPT | Performed by: FAMILY MEDICINE

## 2025-04-14 PROCEDURE — 1126F AMNT PAIN NOTED NONE PRSNT: CPT | Performed by: FAMILY MEDICINE

## 2025-04-14 PROCEDURE — 1159F MED LIST DOCD IN RCRD: CPT | Performed by: FAMILY MEDICINE

## 2025-04-14 PROCEDURE — 99214 OFFICE O/P EST MOD 30 MIN: CPT | Performed by: FAMILY MEDICINE

## 2025-04-14 RX ORDER — ALPRAZOLAM 0.5 MG
0.5 TABLET ORAL 2 TIMES DAILY
Qty: 60 TABLET | Refills: 2 | Status: SHIPPED | OUTPATIENT
Start: 2025-04-14 | End: 2025-04-15 | Stop reason: SDUPTHER

## 2025-04-14 RX ORDER — ZOLPIDEM TARTRATE 10 MG/1
10 TABLET ORAL NIGHTLY PRN
Qty: 30 TABLET | Refills: 2 | Status: SHIPPED | OUTPATIENT
Start: 2025-04-14

## 2025-04-15 ENCOUNTER — TELEPHONE (OUTPATIENT)
Dept: FAMILY MEDICINE CLINIC | Facility: CLINIC | Age: 60
End: 2025-04-15

## 2025-04-15 DIAGNOSIS — F41.9 ANXIETY: Chronic | ICD-10-CM

## 2025-04-15 RX ORDER — ALPRAZOLAM 0.5 MG
0.5 TABLET ORAL 3 TIMES DAILY PRN
Qty: 90 TABLET | Refills: 2 | Status: SHIPPED | OUTPATIENT
Start: 2025-04-15

## 2025-04-15 NOTE — TELEPHONE ENCOUNTER
Caller: Willie Enciso    Relationship: Self    Best call back number: [phone]    Which medication are you concerned about: ALPRAZolam (XANAX) 0.5 MG tablet     What are your concerns: PATIENT WAS ONLY SENT A 60 DAY SUPPLY AFTER SHE AND DR. HERNANDEZ DISCUSSED SENDING 90. PLEASE ADVISE. PLEASE CALL PATIENT WHEN THIS HAS BEEN ADDRESSED.

## 2025-07-03 PROBLEM — F41.9 ANXIETY: Chronic | Status: ACTIVE | Noted: 2025-07-03

## 2025-07-03 NOTE — PROGRESS NOTES
CC: Video Visit for Medical Management    Chief Complaint:   Chief Complaint   Patient presents with    Hyperlipidemia    Anxiety    Depression    Insomnia     Med refill  Video visit      lung cancer screening due       Willie Enciso 59 y.o. female who presents today for Medical Management of the below listed issues. She  has a problem list of   Patient Active Problem List   Diagnosis    Hyperlipidemia    Hypothyroidism    Insomnia    Major depressive disorder, recurrent episode, in full remission    Acute reaction to stress    IFG (impaired fasting glucose)    Anxiety   .  Since the last visit, She has overall felt well.  she has been compliant with   Current Outpatient Medications:     ALPRAZolam (XANAX) 0.5 MG tablet, Take 1 tablet by mouth 3 (Three) Times a Day As Needed for Anxiety., Disp: 90 tablet, Rfl: 2    desvenlafaxine (Pristiq) 100 MG 24 hr tablet, Take 1 tablet by mouth Daily., Disp: 30 tablet, Rfl: 5    fenofibrate micronized (ANTARA) 130 MG capsule, Take 1 capsule by mouth Every Morning Before Breakfast., Disp: 30 capsule, Rfl: 5    levothyroxine (Synthroid) 50 MCG tablet, Take 1 tablet by mouth Daily., Disp: 30 tablet, Rfl: 5    liothyronine (Cytomel) 5 MCG tablet, Take 1 tablet by mouth Daily., Disp: 30 tablet, Rfl: 5    zolpidem (AMBIEN) 10 MG tablet, Take 1 tablet by mouth At Night As Needed for Sleep., Disp: 30 tablet, Rfl: 2.  She denies medication side effects.    All of the other chronic condition(s) listed above are stable w/o issues.    There were no vitals taken for this visit.    Results for orders placed or performed in visit on 01/15/25   [facility], Ur -    Collection Time: 02/10/25 11:41 AM    Specimen: Urine   Result Value Ref Range    Report Summary FINAL     CREATININE 55 mg/dL    Amphetamines, IA Negative CUTOFF:300 ng/mL    Benzodiazepines +POSITIVE+     Diazepam Urine, Qualitative Not Detected ng/mg creat    Desmethyldiazepam Not Detected ng/mg creat    Oxazepam, urine  Not Detected ng/mg creat    Temazepam Not Detected ng/mg creat    Alprazolam Urine, Conf 169 ng/mg creat    Alpha-hydroxyalprazolam, Urine 318 ng/mg creat    Desalkylflurazepam, Urine Not Detected ng/mg creat    Lorazepam, Urine Not Detected ng/mg creat    Alpha-hydroxytriazolam, Urine Not Detected ng/mg creat    Clonazepam Not Detected ng/mg creat    7- AMINOCLONAZEPAM Not Detected ng/mg creat    Midazolam, Urine Not Detected ng/mg creat    Alpha-hydroxymidazolam, Urine Not Detected ng/mg creat    Flunitrazepam Not Detected ng/mg creat    DESMETHYLFLUNITRAZEPAM Not Detected ng/mg creat    COCAINE / METABOLITE, IA Negative CUTOFF:150 ng/mL    Ethyl Alcohol, Enz Negative CUTOFF:0.020 g/dL    Cannavinoids IA Comment CUTOFF:20 ng/mL    6-Acetylmorphine IA Negative CUTOFF:10 ng/mL    Opiate Class IA Negative CUTOFF:100 ng/mL    Oxycodone Class IA Negative CUTOFF:100 ng/mL    METHADONE, IA Negative CUTOFF:100 ng/mL    Methadone MTB IA Negative CUTOFF:100 ng/mL    Buprenorphine, Urine Negative     Buprenorphine, Urine Not Detected ng/mg creat    Norbuprenorphine Not Detected ng/mg creat    Fentanyl Negative     Fentanyl, Urine Not Detected ng/mg creat    Norfentanyl Urine Not Detected ng/mg creat    Tapentadol, IA Negative CUTOFF:200 ng/mL    TRAMADOL, IA Negative CUTOFF:200 ng/mL    Barbiturates, IA Negative CUTOFF:200 ng/mL    PHENCYCLIDINE, IA Negative CUTOFF:25 ng/mL   Cannabinoids, MS, Ur RFX -    Collection Time: 02/10/25 11:41 AM   Result Value Ref Range    Cannabinoid Confirmation +POSITIVE+     Carboxy THC >1818 ng/mg creat             The following portions of the patient's history were reviewed and updated as appropriate: allergies, current medications, past family history, past medical history, past social history, past surgical history, and problem list.    Review of Systems   Constitutional:  Negative for activity change, chills and fever.   Respiratory:  Negative for cough.    Cardiovascular:  Negative for  chest pain.   Psychiatric/Behavioral:  Negative for dysphoric mood.        Objective     BMI is within normal parameters. No other follow-up for BMI required.        Physical Exam  Constitutional:       General: She is not in acute distress.     Appearance: She is well-developed.   Pulmonary:      Effort: Pulmonary effort is normal.   Neurological:      Mental Status: She is alert and oriented to person, place, and time.   Psychiatric:         Behavior: Behavior normal.         Thought Content: Thought content normal.             Diagnoses and all orders for this visit:    1. Anxiety  -     ALPRAZolam (XANAX) 0.5 MG tablet; Take 1 tablet by mouth 3 (Three) Times a Day As Needed for Anxiety.  Dispense: 90 tablet; Refill: 2  -     desvenlafaxine (Pristiq) 100 MG 24 hr tablet; Take 1 tablet by mouth Daily.  Dispense: 30 tablet; Refill: 5    2. Mixed hyperlipidemia  -     fenofibrate micronized (ANTARA) 130 MG capsule; Take 1 capsule by mouth Every Morning Before Breakfast.  Dispense: 30 capsule; Refill: 5  -     Comprehensive Metabolic Panel  -     Lipid Panel    3. Acquired hypothyroidism  -     levothyroxine (Synthroid) 50 MCG tablet; Take 1 tablet by mouth Daily.  Dispense: 30 tablet; Refill: 5  -     liothyronine (Cytomel) 5 MCG tablet; Take 1 tablet by mouth Daily.  Dispense: 30 tablet; Refill: 5  -     T3  -     T4, Free  -     TSH    4. Primary insomnia  -     zolpidem (AMBIEN) 10 MG tablet; Take 1 tablet by mouth At Night As Needed for Sleep.  Dispense: 30 tablet; Refill: 2    Spent  10   minutes with chart and interview and consent for this encounter given by the patient.  You have chosen to receive care through a telehealth visit.  Do you consent to use a video/audio connection for your medical care today? Yes  Patient was in their residence when this visit took place and I was in my medical office.

## 2025-07-07 ENCOUNTER — TELEMEDICINE (OUTPATIENT)
Dept: FAMILY MEDICINE CLINIC | Facility: CLINIC | Age: 60
End: 2025-07-07
Payer: MEDICAID

## 2025-07-07 DIAGNOSIS — F51.01 PRIMARY INSOMNIA: Chronic | ICD-10-CM

## 2025-07-07 DIAGNOSIS — F41.9 ANXIETY: Chronic | ICD-10-CM

## 2025-07-07 DIAGNOSIS — E03.9 ACQUIRED HYPOTHYROIDISM: Chronic | ICD-10-CM

## 2025-07-07 DIAGNOSIS — E78.2 MIXED HYPERLIPIDEMIA: Chronic | ICD-10-CM

## 2025-07-07 PROCEDURE — 99214 OFFICE O/P EST MOD 30 MIN: CPT | Performed by: FAMILY MEDICINE

## 2025-07-07 PROCEDURE — 1126F AMNT PAIN NOTED NONE PRSNT: CPT | Performed by: FAMILY MEDICINE

## 2025-07-07 PROCEDURE — 1160F RVW MEDS BY RX/DR IN RCRD: CPT | Performed by: FAMILY MEDICINE

## 2025-07-07 PROCEDURE — 1159F MED LIST DOCD IN RCRD: CPT | Performed by: FAMILY MEDICINE

## 2025-07-07 RX ORDER — LEVOTHYROXINE SODIUM 50 UG/1
50 TABLET ORAL DAILY
Qty: 30 TABLET | Refills: 5 | Status: SHIPPED | OUTPATIENT
Start: 2025-07-07

## 2025-07-07 RX ORDER — FENOFIBRATE 130 MG/1
130 CAPSULE ORAL
Qty: 30 CAPSULE | Refills: 5 | Status: SHIPPED | OUTPATIENT
Start: 2025-07-07 | End: 2026-07-07

## 2025-07-07 RX ORDER — ZOLPIDEM TARTRATE 10 MG/1
10 TABLET ORAL NIGHTLY PRN
Qty: 30 TABLET | Refills: 2 | Status: SHIPPED | OUTPATIENT
Start: 2025-07-07

## 2025-07-07 RX ORDER — ALPRAZOLAM 0.5 MG
0.5 TABLET ORAL 3 TIMES DAILY PRN
Qty: 90 TABLET | Refills: 2 | Status: SHIPPED | OUTPATIENT
Start: 2025-07-07

## 2025-07-07 RX ORDER — LIOTHYRONINE SODIUM 5 UG/1
5 TABLET ORAL DAILY
Qty: 30 TABLET | Refills: 5 | Status: SHIPPED | OUTPATIENT
Start: 2025-07-07

## 2025-07-07 RX ORDER — DESVENLAFAXINE 100 MG/1
100 TABLET, EXTENDED RELEASE ORAL DAILY
Qty: 30 TABLET | Refills: 5 | Status: SHIPPED | OUTPATIENT
Start: 2025-07-07

## 2025-08-07 ENCOUNTER — TELEPHONE (OUTPATIENT)
Dept: FAMILY MEDICINE CLINIC | Facility: CLINIC | Age: 60
End: 2025-08-07
Payer: MEDICAID

## 2025-08-13 ENCOUNTER — TELEPHONE (OUTPATIENT)
Dept: FAMILY MEDICINE CLINIC | Facility: CLINIC | Age: 60
End: 2025-08-13
Payer: MEDICAID

## 2025-08-14 ENCOUNTER — PATIENT MESSAGE (OUTPATIENT)
Dept: FAMILY MEDICINE CLINIC | Facility: CLINIC | Age: 60
End: 2025-08-14
Payer: MEDICAID